# Patient Record
Sex: MALE | Race: BLACK OR AFRICAN AMERICAN | NOT HISPANIC OR LATINO | Employment: UNEMPLOYED | URBAN - METROPOLITAN AREA
[De-identification: names, ages, dates, MRNs, and addresses within clinical notes are randomized per-mention and may not be internally consistent; named-entity substitution may affect disease eponyms.]

---

## 2018-06-18 ENCOUNTER — HOSPITAL ENCOUNTER (OUTPATIENT)
Facility: HOSPITAL | Age: 38
Discharge: HOME | End: 2018-06-19
Attending: EMERGENCY MEDICINE | Admitting: INTERNAL MEDICINE
Payer: MEDICAID

## 2018-06-18 DIAGNOSIS — R07.2 PRECORDIAL PAIN: ICD-10-CM

## 2018-06-18 DIAGNOSIS — R79.89 ELEVATED TROPONIN: ICD-10-CM

## 2018-06-18 DIAGNOSIS — B18.2 CHRONIC HEPATITIS C WITHOUT HEPATIC COMA: ICD-10-CM

## 2018-06-18 DIAGNOSIS — J98.4 PNEUMONITIS: ICD-10-CM

## 2018-06-18 DIAGNOSIS — T40.1X1A ACCIDENTAL OVERDOSE OF HEROIN, INITIAL ENCOUNTER: Primary | ICD-10-CM

## 2018-06-18 DIAGNOSIS — R07.9 CHEST PAIN: ICD-10-CM

## 2018-06-18 LAB
ALBUMIN SERPL BCP-MCNC: 3.7 G/DL
ALP SERPL-CCNC: 73 U/L
ALT SERPL W/O P-5'-P-CCNC: 64 U/L
AMPHET+METHAMPHET UR QL: NEGATIVE
ANION GAP SERPL CALC-SCNC: 11 MMOL/L
APAP SERPL-MCNC: <3 UG/ML
AST SERPL-CCNC: 59 U/L
BARBITURATES UR QL SCN>200 NG/ML: NEGATIVE
BASOPHILS # BLD AUTO: 0.03 K/UL
BASOPHILS NFR BLD: 0.4 %
BENZODIAZ UR QL SCN>200 NG/ML: NEGATIVE
BILIRUB SERPL-MCNC: 1.4 MG/DL
BUN SERPL-MCNC: 11 MG/DL
BZE UR QL SCN: NEGATIVE
CALCIUM SERPL-MCNC: 9.1 MG/DL
CANNABINOIDS UR QL SCN: NEGATIVE
CHLORIDE SERPL-SCNC: 100 MMOL/L
CO2 SERPL-SCNC: 24 MMOL/L
CREAT SERPL-MCNC: 1 MG/DL
CREAT UR-MCNC: 221.4 MG/DL
DIFFERENTIAL METHOD: ABNORMAL
EOSINOPHIL # BLD AUTO: 0.1 K/UL
EOSINOPHIL NFR BLD: 1.3 %
ERYTHROCYTE [DISTWIDTH] IN BLOOD BY AUTOMATED COUNT: 12.4 %
EST. GFR  (AFRICAN AMERICAN): >60 ML/MIN/1.73 M^2
EST. GFR  (NON AFRICAN AMERICAN): >60 ML/MIN/1.73 M^2
ETHANOL SERPL-MCNC: <10 MG/DL
GLUCOSE SERPL-MCNC: 119 MG/DL
HCT VFR BLD AUTO: 46.5 %
HGB BLD-MCNC: 15.1 G/DL
LYMPHOCYTES # BLD AUTO: 1.3 K/UL
LYMPHOCYTES NFR BLD: 15.2 %
MCH RBC QN AUTO: 29.2 PG
MCHC RBC AUTO-ENTMCNC: 32.5 G/DL
MCV RBC AUTO: 90 FL
METHADONE UR QL SCN>300 NG/ML: NEGATIVE
MONOCYTES # BLD AUTO: 0.5 K/UL
MONOCYTES NFR BLD: 6.3 %
NEUTROPHILS # BLD AUTO: 6.5 K/UL
NEUTROPHILS NFR BLD: 76.7 %
OPIATES UR QL SCN: NORMAL
PCP UR QL SCN>25 NG/ML: NEGATIVE
PLATELET # BLD AUTO: 178 K/UL
PMV BLD AUTO: 11.3 FL
POTASSIUM SERPL-SCNC: 4.1 MMOL/L
PROT SERPL-MCNC: 6.9 G/DL
RBC # BLD AUTO: 5.17 M/UL
SALICYLATES SERPL-MCNC: <5 MG/DL
SODIUM SERPL-SCNC: 135 MMOL/L
TOXICOLOGY INFORMATION: NORMAL
WBC # BLD AUTO: 8.53 K/UL

## 2018-06-18 PROCEDURE — 80307 DRUG TEST PRSMV CHEM ANLYZR: CPT

## 2018-06-18 PROCEDURE — 99285 EMERGENCY DEPT VISIT HI MDM: CPT | Mod: 25

## 2018-06-18 PROCEDURE — 84484 ASSAY OF TROPONIN QUANT: CPT

## 2018-06-18 PROCEDURE — 93005 ELECTROCARDIOGRAM TRACING: CPT

## 2018-06-18 PROCEDURE — 80053 COMPREHEN METABOLIC PANEL: CPT

## 2018-06-18 PROCEDURE — 80329 ANALGESICS NON-OPIOID 1 OR 2: CPT

## 2018-06-18 PROCEDURE — 93010 ELECTROCARDIOGRAM REPORT: CPT | Mod: ,,, | Performed by: INTERNAL MEDICINE

## 2018-06-18 PROCEDURE — 80320 DRUG SCREEN QUANTALCOHOLS: CPT

## 2018-06-18 PROCEDURE — 85025 COMPLETE CBC W/AUTO DIFF WBC: CPT

## 2018-06-19 VITALS
HEIGHT: 66 IN | OXYGEN SATURATION: 97 % | SYSTOLIC BLOOD PRESSURE: 117 MMHG | TEMPERATURE: 99 F | RESPIRATION RATE: 18 BRPM | WEIGHT: 170.5 LBS | DIASTOLIC BLOOD PRESSURE: 73 MMHG | BODY MASS INDEX: 27.4 KG/M2 | HEART RATE: 61 BPM

## 2018-06-19 PROBLEM — R07.9 CHEST PAIN: Status: ACTIVE | Noted: 2018-06-19

## 2018-06-19 PROBLEM — B19.20 HEPATITIS C: Status: ACTIVE | Noted: 2018-06-19

## 2018-06-19 PROBLEM — J98.4 PNEUMONITIS: Status: ACTIVE | Noted: 2018-06-19

## 2018-06-19 PROBLEM — R79.89 ELEVATED TROPONIN: Status: ACTIVE | Noted: 2018-06-19

## 2018-06-19 PROBLEM — T40.1X1A ACCIDENTAL OVERDOSE OF HEROIN: Status: ACTIVE | Noted: 2018-06-19

## 2018-06-19 LAB
ANION GAP SERPL CALC-SCNC: 8 MMOL/L
BASOPHILS # BLD AUTO: 0.02 K/UL
BASOPHILS NFR BLD: 0.2 %
BUN SERPL-MCNC: 11 MG/DL
CALCIUM SERPL-MCNC: 9.1 MG/DL
CHLORIDE SERPL-SCNC: 102 MMOL/L
CK SERPL-CCNC: 554 U/L
CO2 SERPL-SCNC: 26 MMOL/L
CREAT SERPL-MCNC: 0.9 MG/DL
DIASTOLIC DYSFUNCTION: NO
DIFFERENTIAL METHOD: ABNORMAL
EOSINOPHIL # BLD AUTO: 0.1 K/UL
EOSINOPHIL NFR BLD: 1.6 %
ERYTHROCYTE [DISTWIDTH] IN BLOOD BY AUTOMATED COUNT: 12.5 %
EST. GFR  (AFRICAN AMERICAN): >60 ML/MIN/1.73 M^2
EST. GFR  (NON AFRICAN AMERICAN): >60 ML/MIN/1.73 M^2
ESTIMATED PA SYSTOLIC PRESSURE: 21.15
GLUCOSE SERPL-MCNC: 105 MG/DL
HCT VFR BLD AUTO: 42.7 %
HGB BLD-MCNC: 13.9 G/DL
LYMPHOCYTES # BLD AUTO: 2.6 K/UL
LYMPHOCYTES NFR BLD: 29.2 %
MCH RBC QN AUTO: 29 PG
MCHC RBC AUTO-ENTMCNC: 32.6 G/DL
MCV RBC AUTO: 89 FL
MONOCYTES # BLD AUTO: 0.7 K/UL
MONOCYTES NFR BLD: 7.7 %
NEUTROPHILS # BLD AUTO: 5.4 K/UL
NEUTROPHILS NFR BLD: 61.2 %
PLATELET # BLD AUTO: 177 K/UL
PMV BLD AUTO: 11.9 FL
POTASSIUM SERPL-SCNC: 4.2 MMOL/L
RBC # BLD AUTO: 4.8 M/UL
RETIRED EF AND QEF - SEE NOTES: 65 (ref 55–65)
SODIUM SERPL-SCNC: 136 MMOL/L
TROPONIN I SERPL DL<=0.01 NG/ML-MCNC: 0.03 NG/ML
TROPONIN I SERPL DL<=0.01 NG/ML-MCNC: 0.04 NG/ML
TROPONIN I SERPL DL<=0.01 NG/ML-MCNC: 0.07 NG/ML
TROPONIN I SERPL DL<=0.01 NG/ML-MCNC: 0.1 NG/ML
WBC # BLD AUTO: 8.81 K/UL

## 2018-06-19 PROCEDURE — 80048 BASIC METABOLIC PNL TOTAL CA: CPT

## 2018-06-19 PROCEDURE — 84484 ASSAY OF TROPONIN QUANT: CPT | Mod: 91

## 2018-06-19 PROCEDURE — 36415 COLL VENOUS BLD VENIPUNCTURE: CPT

## 2018-06-19 PROCEDURE — 82550 ASSAY OF CK (CPK): CPT

## 2018-06-19 PROCEDURE — 93306 TTE W/DOPPLER COMPLETE: CPT

## 2018-06-19 PROCEDURE — G0378 HOSPITAL OBSERVATION PER HR: HCPCS

## 2018-06-19 PROCEDURE — 93010 ELECTROCARDIOGRAM REPORT: CPT | Mod: 76,,, | Performed by: INTERNAL MEDICINE

## 2018-06-19 PROCEDURE — 25500020 PHARM REV CODE 255: Performed by: EMERGENCY MEDICINE

## 2018-06-19 PROCEDURE — 84484 ASSAY OF TROPONIN QUANT: CPT

## 2018-06-19 PROCEDURE — 80074 ACUTE HEPATITIS PANEL: CPT

## 2018-06-19 PROCEDURE — 93010 ELECTROCARDIOGRAM REPORT: CPT | Mod: ,,, | Performed by: INTERNAL MEDICINE

## 2018-06-19 PROCEDURE — 93005 ELECTROCARDIOGRAM TRACING: CPT

## 2018-06-19 PROCEDURE — 86703 HIV-1/HIV-2 1 RESULT ANTBDY: CPT

## 2018-06-19 PROCEDURE — 63600175 PHARM REV CODE 636 W HCPCS: Performed by: STUDENT IN AN ORGANIZED HEALTH CARE EDUCATION/TRAINING PROGRAM

## 2018-06-19 PROCEDURE — 85025 COMPLETE CBC W/AUTO DIFF WBC: CPT

## 2018-06-19 PROCEDURE — 25000003 PHARM REV CODE 250: Performed by: EMERGENCY MEDICINE

## 2018-06-19 RX ORDER — HEPARIN SODIUM 5000 [USP'U]/ML
5000 INJECTION, SOLUTION INTRAVENOUS; SUBCUTANEOUS EVERY 8 HOURS
Status: DISCONTINUED | OUTPATIENT
Start: 2018-06-19 | End: 2018-06-19 | Stop reason: HOSPADM

## 2018-06-19 RX ORDER — ACETAMINOPHEN 325 MG/1
650 TABLET ORAL EVERY 6 HOURS PRN
Status: DISCONTINUED | OUTPATIENT
Start: 2018-06-19 | End: 2018-06-19 | Stop reason: HOSPADM

## 2018-06-19 RX ORDER — ASPIRIN 325 MG
325 TABLET ORAL
Status: COMPLETED | OUTPATIENT
Start: 2018-06-19 | End: 2018-06-19

## 2018-06-19 RX ADMIN — ASPIRIN 325 MG ORAL TABLET 325 MG: 325 PILL ORAL at 12:06

## 2018-06-19 RX ADMIN — IOHEXOL 100 ML: 350 INJECTION, SOLUTION INTRAVENOUS at 02:06

## 2018-06-19 RX ADMIN — HEPARIN SODIUM 5000 UNITS: 5000 INJECTION, SOLUTION INTRAVENOUS; SUBCUTANEOUS at 06:06

## 2018-06-19 NOTE — ED NOTES
"Pt reports had hiccups for over 24 hours and chest pain yesterday. Left sided and had a "dry Cracking" pain. Denies pain at this time. Will continue to monitor closely.   "

## 2018-06-19 NOTE — ED NOTES
"Pt presents to ED secondary to suspected drug overdose. EMS reports significant other found patient on bathroom floor with agonal respirations. Reported to have history of heroin addiction and previous rehab completion. EMS reports given 1mg IV and pt became responsive. Upon arrival, pt appears to be angry. States "dont talk about me like you know me." Denies drug use. Alert and oriented. Will continue to monitor closely.     APPEARANCE: Alert, oriented and in no acute distress.  CARDIAC: Normal rate   PERIPHERAL VASCULAR: peripheral pulses present. Normal cap refill. No edema. Warm to touch.    RESPIRATORY:Normal rate and effort, breath sounds clear bilaterally throughout chest. Respirations are equal and unlabored no obvious signs of distress.  GASTRO: soft, bowel sounds normal, no tenderness, no abdominal distention.  MUSC: Full ROM. No bony tenderness or soft tissue tenderness. No obvious deformity.  SKIN: Skin is warm and dry, normal skin turgor, mucous membranes moist.  NEURO: 5/5 strength major flexors/extensors bilaterally. Sensory intact to light touch bilaterally. Supa coma scale: eyes open spontaneously-4, oriented & converses-5, obeys commands-6. No neurological abnormalities.     "

## 2018-06-19 NOTE — PLAN OF CARE
Gave patient drug rehab written resources.  Patient will [facility]up [facility]    No dme or home health needs    Discharge rounds on patient. Discussed followup appointments, blue discharge folder, discharge nurse will go over home medications and reasons for medications and final discharge instructions. All patient/caregiver questions answered. Patient verbalized understanding.         06/19/18 1540   Final Note   Assessment Type Final Discharge Note   Discharge Disposition Home   Hospital Follow Up  Appt(s) scheduled? No   Discharge plans and expectations educations in teach back method with documentation complete? Yes   Right Care Referral Info   Post Acute Recommendation No Care     Sarah Ashraf, RN, CCM, CMSRN  RN Transition Navigator  [phone]

## 2018-06-19 NOTE — ED PROVIDER NOTES
Encounter Date: 6/18/2018    SCRIBE #1 NOTE: I, Nicho Mattson, am scribing for, and in the presence of,  Sosa Long MD. I have scribed the entire note.        History     Chief Complaint   Patient presents with    Drug Overdose     Pt to ED via EMS with reports of being found by significant other on bathroom floor with agonal respirations. States SO had to break the door down to get to patient. Paramedic reports giving narcan 1mg IV and patient became responsive. Awake, alert and oriented on arrival. Denies drug use. Flat affect noted.      Time patient was seen by the provider: 10:21 PM      The patient is a 37 y.o. male who presents to the ED with a complaint of possible drug overdose. The patient states he has a HX of Heroin abuse but has been clean for 4 months. The patient's girlfriend noticed the patient's absence at home tonight. She had to break down the bathroom door where she found him down on the ground unresponsive. EMS arrived and he had sonorous respirations.  They gave 1mg of Narcan and report he woke up almost immediately. The patient denies taking drugs but states that he very likely did because narcan made him wake up. At this time he reports headache and states he had hiccups all day yesterday and episodes of chest pain in the last day or two. Otherwise he has no complaints. He denies SI/HI.     He reports HX of alcohol use, denies cigarettes.            Review of patient's allergies indicates:  No Known Allergies  History reviewed. No pertinent past medical history.  Past Surgical History:   Procedure Laterality Date    NOSE SURGERY       History reviewed. No pertinent family history.  Social History   Substance Use Topics    Smoking status: Current Every Day Smoker     Packs/day: 0.50     Types: Cigarettes    Smokeless tobacco: Not on file    Alcohol use No     Review of Systems   Constitutional: Negative for fever.   HENT: Negative for sore throat.         Hiccups   Respiratory: Negative for  shortness of breath.    Cardiovascular: Positive for chest pain.   Gastrointestinal: Negative for nausea.   Genitourinary: Negative for dysuria.   Musculoskeletal: Negative for back pain.   Skin: Negative for rash.   Neurological: Positive for headaches. Negative for weakness.        LOC   Hematological: Does not bruise/bleed easily.   Psychiatric/Behavioral: Negative for suicidal ideas.   All other systems reviewed and are negative.      Physical Exam     Initial Vitals [06/18/18 2206]   BP Pulse Resp Temp SpO2   117/76 92 16 98.6 °F (37 °C) (!) 92 %      MAP       --         Physical Exam    Nursing note and vitals reviewed.  Constitutional: He appears well-developed and well-nourished. No distress.   HENT:   Head: Normocephalic and atraumatic.   Eyes: EOM are normal. Pupils are equal, round, and reactive to light.   Neck: Normal range of motion. Neck supple.   Cardiovascular: Normal rate, regular rhythm and normal heart sounds.   Pulmonary/Chest: Breath sounds normal. No respiratory distress.   Abdominal: Bowel sounds are normal.   Musculoskeletal: He exhibits no edema.   Neurological: He is alert and oriented to person, place, and time.   Skin: Skin is warm and dry.   Psychiatric: He has a normal mood and affect. His behavior is normal.         ED Course   Procedures  Labs Reviewed   CBC W/ AUTO DIFFERENTIAL - Abnormal; Notable for the following:        Result Value    Gran% 76.7 (*)     Lymph% 15.2 (*)     All other components within normal limits   COMPREHENSIVE METABOLIC PANEL - Abnormal; Notable for the following:     Sodium 135 (*)     Glucose 119 (*)     Total Bilirubin 1.4 (*)     AST 59 (*)     ALT 64 (*)     All other components within normal limits   SALICYLATE LEVEL - Abnormal; Notable for the following:     Salicylate Lvl <5.0 (*)     All other components within normal limits   ACETAMINOPHEN LEVEL - Abnormal; Notable for the following:     Acetaminophen (Tylenol), Serum <3.0 (*)     All other  components within normal limits   TROPONIN I - Abnormal; Notable for the following:     Troponin I 0.039 (*)     All other components within normal limits   TROPONIN I - Abnormal; Notable for the following:     Troponin I 0.096 (*)     All other components within normal limits   DRUG SCREEN PANEL, URINE EMERGENCY   ALCOHOL,MEDICAL (ETHANOL)     EKG Readings: (Independently Interpreted)   Rhythm: Normal Sinus Rhythm. Heart Rate: 82. ST Segments: Normal ST Segments. T Waves Flipped: III and AVF. Axis: Normal. Q Waves: III.   Other EKG Interpretations: EKG #2  NSR, HR 64  TWIs III, aVF  Q wave III       Imaging Results          X-Ray Chest PA And Lateral (Final result)  Result time 06/18/18 22:53:14    Final result by Sebastian Colon MD (06/18/18 22:53:14)                 Impression:      No acute cardiopulmonary process.      Electronically signed by: Sebastian Colon MD  Date:    06/18/2018  Time:    22:53             Narrative:    EXAMINATION:  XR CHEST PA AND LATERAL    CLINICAL HISTORY:  CP;    TECHNIQUE:  PA and lateral views of the chest were performed.    COMPARISON:  None.    FINDINGS:  There is no consolidation, effusion, or pneumothorax.    Cardiomediastinal silhouette is unremarkable.    Regional osseous structures are unremarkable.                                 Medical Decision Making:   History:   I obtained history from: EMS provider.  Clinical Tests:   Lab Tests: Ordered and Reviewed  Radiological Study: Ordered and Reviewed  Medical Tests: Reviewed and Ordered  ED Management:  37M with presumed drug overdose - found unresponsive in bathroom at home; hx of heroin use.  +response to narcan.  Reported recent CP in ER.  Troponin is elevated. +opiates on drug screen.  No cocaine.  Will repeat 3 hour troponin.  Also with sats consistently 94%, will check CTA.    Repeat troponin is more elevated.  Awaiting CTA.  Will plan to admit.      Case discussed with Dr. Calhoun at end of my shift.  Pt awaiting CTA  results and admission.  Other:   I have discussed this case with another health care provider.                      Clinical Impression:   The primary encounter diagnosis was Accidental overdose of heroin, initial encounter. A diagnosis of Elevated troponin was also pertinent to this visit.         I, Dr. Sosa Long, personally performed the services described in this documentation.   All medical record entries made by the scribe were at my direction and in my presence.   I have reviewed the chart and agree that the record is accurate and complete.   Sosa Long MD.  1:26 AM 06/19/2018                    Sosa Long MD  06/19/18 0227

## 2018-06-19 NOTE — PLAN OF CARE
"Patient AAOx3  Lives at home with mother  Was in [place] at rehab ([facility] - inpatient drug rehab)- for 30 days then lived in a sober living facility- he said he had to move back home to [place] due to being on parole until October 2018. Patient states he "slipped up" but wants help and doesn't want to do drugs.   Lives at home with mother.  has 2 children.    Interested in drug rehab resources.        06/19/18 1053   Discharge Assessment   Assessment Type Discharge Planning Assessment   Confirmed/corrected address and phone number on facesheet? Yes   Assessment information obtained from? Patient   Communicated expected length of stay with patient/caregiver yes   Prior to hospitilization cognitive status: Alert/Oriented   Prior to hospitalization functional status: Independent   Current cognitive status: Alert/Oriented   Current Functional Status: Independent   Lives With parent(s)   Able to Return to Prior Arrangements yes   Patient's perception of discharge disposition home or selfcare   Readmission Within The Last 30 Days no previous admission in last 30 days   Patient currently being followed by outpatient case management? No   Patient currently receives any other outside agency services? Yes   Is it the patient/care giver preference to resume care with the current outside agency? No   Equipment Currently Used at Home none   Do you have any problems affording any of your prescribed medications? No   Is the patient taking medications as prescribed? yes   Does the patient have transportation home? Yes   Transportation Available family or friend will provide   Discharge Plan A Home   Discharge Plan B Home   Patient/Family In Agreement With Plan yes     Sarah Ashraf RN, CCM, CMSRN  RN Transition Navigator  [phone]      "

## 2018-06-19 NOTE — H&P
"[facility] Medicine   History and Physical  Intern Note    Admitting Team: [facility] internal medicine Team A  Attending Physician: Chris  Resident: Yamileth  Interns: Dena Medina Float: Lorenzo    Date of Admit: 6/18/2018    Chief Complaint     Heroin Overdose for 1 day    Subjective:      History of Present Illness:  José Luis Rolon is a 37 y.o.  male with a PMHx of Heroin abuse and hepatitis C infection. The patient presented to [facility] on 6/18/2018 with a primary complaint of LOC.    The patient was in their usual state of health until The evening of presentation. The patient was reportedly found in the bathroom by his significant other unconscious and with agonal breathing. EMS was called and the patient was given narcan 1 mg IV x 1 in the field with improvement in his Mental status. The patient became more responsive after Narcan and he was brought to the emergency room. Patient reports that he dose not remember much of the event but believes he was only on the floor for approximately 20 minutes. He denies using heroin today but again reports that he does not remember anything over the past couple of days. In addition, the patient reports that he has been having substernal chest pain for approximately 1 day. He has difficulty describing the pain but reports that it feels like something is "Crackling in his chest" and that he feels like he may need to throw up. The pain is worse when he bends over. He also notices it more while walking. He otherwise denies having any complaints. Denies fever, chills, nausea, vomiting, abdominal pain, HA, vision changes, heart palpitations, or any other aches or pain.     Past Medical History:  History reviewed. No pertinent past medical history.    Past Surgical History:  Past Surgical History:   Procedure Laterality Date    NOSE SURGERY          Allergies:  Review of patient's allergies indicates:  No Known Allergies    Home " "Medications:  Prior to Admission medications    Medication Sig Start Date End Date Taking? Authorizing Provider   naproxen sodium (ANAPROX) 550 MG tablet Take 1 tablet (550 mg total) by mouth 2 (two) times daily with meals. 16  Yes SHAILESH Vela       Family History:  History reviewed. No pertinent family history.    Social History:  Social History   Substance Use Topics    Smoking status: Current Every Day Smoker     Packs/day: 0.50     Types: Cigarettes    Smokeless tobacco: Not on file    Alcohol use No       Review of Systems:  Pertinent items are noted in HPI. All other systems are reviewed and are negative.    Health Maintaince :   Primary Care Physician: Sadaf Stratton)  Immunizations:   TDap is up to date.  Influenza is up to date.  Pneumovax is not up to date.        Objective:   Last 24 Hour Vital Signs:  BP  Min: 100/52  Max: 121/69  Temp  Av.5 °F (36.9 °C)  Min: 98.4 °F (36.9 °C)  Max: 98.6 °F (37 °C)  Pulse  Av.6  Min: 63  Max: 92  Resp  Av.9  Min: 10  Max: 18  SpO2  Av %  Min: 91 %  Max: 98 %  Height  Av' 6" (167.6 cm)  Min: 5' 6" (167.6 cm)  Max: 5' 6" (167.6 cm)  Weight  Av.2 kg (170 lb 4 oz)  Min: 77.1 kg (170 lb)  Max: 77.3 kg (170 lb 8 oz)  Body mass index is 27.52 kg/m².  No intake/output data recorded.    Physical Examination:  General: Sitting up, HOB at 45 degrees, alert, NAD  HEAD: Normocephalic, Atraumatic    Eyes: EOMI, Pupil equal, round, and reactive to light   Oropharynx: Clear, MMM, Normal dentition   Neck: Supple, Trachea midline   Chest: Non-tender to palpation  Heart: RRR, No murmurs, gallops, or rubs   Abdomen: Soft, non-tender, non-distended   Extremities: WWP, no edema   Pulses 2+  Skin: no rashes or skin changes appreciated   Neuro: Alert and oriented to person and place, confused to time/ day of the week.       Laboratory:  Most Recent Data:  CBC: Lab Results   Component Value Date    WBC 8.53 2018    HGB 15.1 " 06/18/2018    HCT 46.5 06/18/2018     06/18/2018    MCV 90 06/18/2018    RDW 12.4 06/18/2018       BMP: Lab Results   Component Value Date     (L) 06/18/2018    K 4.1 06/18/2018     06/18/2018    CO2 24 06/18/2018    BUN 11 06/18/2018    CREATININE 1.0 06/18/2018     (H) 06/18/2018    CALCIUM 9.1 06/18/2018    MG 1.8 09/06/2010    PHOS 3.0 09/06/2010     LFTs: Lab Results   Component Value Date    PROT 6.9 06/18/2018    ALBUMIN 3.7 06/18/2018    BILITOT 1.4 (H) 06/18/2018    AST 59 (H) 06/18/2018    ALKPHOS 73 06/18/2018    ALT 64 (H) 06/18/2018     Coags: No results found for: INR, PROTIME, PTT  FLP: Lab Results   Component Value Date    CHOL 88 (L) 09/05/2010    HDL 36 (L) 09/05/2010    LDLCALC 37.0 (L) 09/05/2010    TRIG 75 09/05/2010    CHOLHDL 40.9 09/05/2010     DM: Lab Results   Component Value Date    LDLCALC 37.0 (L) 09/05/2010    CREATININE 1.0 06/18/2018     Thyroid: No results found for: TSH, FREET4, S6DOZUA, E2LLWLC, THYROIDAB  Anemia: No results found for: IRON, TIBC, FERRITIN, DBBAPJXK10, FOLATE  Cardiac: Lab Results   Component Value Date    TROPONINI 0.096 (H) 06/19/2018    BNP 58 09/05/2010     Urinalysis: Lab Results   Component Value Date    COLORU ÁLVARO 09/04/2010    SPECGRAV 1.025 09/04/2010    NITRITE NEG 09/04/2010    KETONESU NEG 09/04/2010    UROBILINOGEN 8 (A) 09/04/2010    WBCUA 1-2 09/04/2010       Trended Lab Data:    Recent Labs  Lab 06/18/18  2313   WBC 8.53   HGB 15.1   HCT 46.5      MCV 90   RDW 12.4   *   K 4.1      CO2 24   BUN 11   CREATININE 1.0   *   PROT 6.9   ALBUMIN 3.7   BILITOT 1.4*   AST 59*   ALKPHOS 73   ALT 64*       Trended Cardiac Data:    Recent Labs  Lab 06/18/18  2313 06/19/18  0131   TROPONINI 0.039* 0.096*       Microbiology Data:      Other Results:  EKG (my interpretation):         Radiology:  Imaging Results          CTA Chest Non-Coronary (PE Study) (Final result)  Result time 06/19/18 02:57:14    Final  result by Hiram Borden MD (06/19/18 02:57:14)                 Impression:      Extensive motion degraded examination.  No filling defects within the central pulmonary tree.  Suggest correlation with venous studies and additional clinical parameters.    Diffuse ground-glass airspace opacities in the right upper lobe and bilateral lower lobes.  The findings represent nonspecific pneumonitis.    Additional findings as above.      Electronically signed by: Hiram Borden MD  Date:    06/19/2018  Time:    02:57             Narrative:    EXAMINATION:  CTA CHEST NON CORONARY    CLINICAL HISTORY:  Chest pain, acute, PE suspected, low pretest prob;    TECHNIQUE:  Low dose axial images, sagittal and coronal reformations were obtained from the thoracic inlet to the lung bases following the IV administration of 100 mL of Omnipaque 350.  Contrast timing was optimized to evaluate the pulmonary arteries.  MIP images were performed.    COMPARISON:  Chest x-ray dated 06/18/2018    FINDINGS:  CT pulmonary embolism examination:    The examination is somewhat degraded secondary to motion.  No filling defects are identified within the central pulmonary tree.  The distal and segmental pulmonary tree are poorly visualized.    CT chest examination:    The thyroid gland is within normal limits.  The base of the neck is unremarkable.  The supraclavicular regions are unremarkable.    The trachea and central airways are within normal limits.  No endobronchial lesion is identified.  There is no evidence of bronchiectasis.    The heart is unremarkable.  There are no pericardial effusions.  There is no evidence of intracardiac thrombus.  No coronary artery calcifications are identified.    The thoracic aorta is normal in caliber.  There is no intimal flap to suggest dissection.  The great vessels arising from the aortic arch are within normal limits.  There is an azygos fissure.    There are subcentimeter subaortic and left paratracheal lymph  nodes.  There is a 1.2 x 1.3 cm right hilar lymph node.  The axillary regions are within normal limits.    There are no pleural effusions.  There is no evidence of a pneumothorax.  There is no evidence of pneumomediastinum.  There are ground-glass airspace opacities in the right upper lobe and bilateral lower lobes.  No discrete pulmonary nodule is present.    The esophagus is within normal limits.  The visualized upper abdominal structures are unremarkable.  There is no evidence of free air in the upper abdomen.    The chest wall is unremarkable.  The osseous structures are unremarkable.                               X-Ray Chest PA And Lateral (Final result)  Result time 06/18/18 22:53:14    Final result by Sebastian Colon MD (06/18/18 22:53:14)                 Impression:      No acute cardiopulmonary process.      Electronically signed by: Sebastian Colon MD  Date:    06/18/2018  Time:    22:53             Narrative:    EXAMINATION:  XR CHEST PA AND LATERAL    CLINICAL HISTORY:  CP;    TECHNIQUE:  PA and lateral views of the chest were performed.    COMPARISON:  None.    FINDINGS:  There is no consolidation, effusion, or pneumothorax.    Cardiomediastinal silhouette is unremarkable.    Regional osseous structures are unremarkable.                                   Assessment:     José Luis Rolon is a 37 y.o. male with a PMHx of  presenting at [facility] with Opiate Overdose       Plan:     Chest pain:   -Atypical chest pain x 1 day   -Troponin 0.039 --> 0.096. Trending   -EKG with T-wave inversions in inferior leads - trending   -Echo pending     Opiate Abuse  -Patient reports that last use was over 1 week ago   -Does not remember using yesterday   -Responsive to Narcan   -  on Cessation   -Tx withdrawal symptoms PRN     Hepatitis C   -Patient reports known infection   AST 59, ALT 64   -Hep panel pending, HIV pending     Pneumonitis  -Noted on chest CTA  -Likely 2/2 Heroin Abuse   -No respiratory  distress   -Follow up with PCP    DVT ppx: Heparin     Dispo: admit to observation. Pending chest pain work up.       Code Status:     FULL code    [unfilled]  [facility] Internal medicine team A     [facility] Medicine Hospitalist Pager numbers:   [facility] Hospitalist Medicine Team A (Marybel/Chris): [phone]  [facility] Hospitalist Medicine Team B (Barbi/Ash):  [phone]

## 2018-06-19 NOTE — ED NOTES
"Pt states pain to middle of chest rated at a 3 out of 10. He states that when trying to drink water, he feels like "the water get stuck" in the middle of his chest, and pain goes to a 7 out of 10. Pt swallowed the water and medicine without deficits, but did cause this exacerbation of pain.  "

## 2018-06-20 LAB
HAV IGM SERPL QL IA: NEGATIVE
HBV CORE IGM SERPL QL IA: NEGATIVE
HBV SURFACE AG SERPL QL IA: NEGATIVE
HCV AB SERPL QL IA: POSITIVE
HIV 1+2 AB+HIV1 P24 AG SERPL QL IA: NEGATIVE
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
## 2018-10-03 ENCOUNTER — HOSPITAL ENCOUNTER (EMERGENCY)
Facility: HOSPITAL | Age: 38
Discharge: HOME | End: 2018-10-03
Attending: EMERGENCY MEDICINE
Payer: MEDICAID

## 2018-10-03 VITALS
HEIGHT: 66 IN | SYSTOLIC BLOOD PRESSURE: 120 MMHG | HEART RATE: 72 BPM | TEMPERATURE: 99 F | BODY MASS INDEX: 27.32 KG/M2 | WEIGHT: 170 LBS | DIASTOLIC BLOOD PRESSURE: 76 MMHG | OXYGEN SATURATION: 100 % | RESPIRATION RATE: 18 BRPM
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
## 2018-10-03 DIAGNOSIS — M25.522 ELBOW PAIN, LEFT: ICD-10-CM

## 2018-10-03 DIAGNOSIS — S50.12XA: Primary | ICD-10-CM

## 2018-10-03 PROCEDURE — 99284 EMERGENCY DEPT VISIT MOD MDM: CPT | Mod: 25

## 2018-10-03 PROCEDURE — 96372 THER/PROPH/DIAG INJ SC/IM: CPT

## 2018-10-03 PROCEDURE — 63600175 PHARM REV CODE 636 W HCPCS: Performed by: EMERGENCY MEDICINE

## 2018-10-03 RX ORDER — KETOROLAC TROMETHAMINE 30 MG/ML
30 INJECTION, SOLUTION INTRAMUSCULAR; INTRAVENOUS
Status: COMPLETED | OUTPATIENT
Start: 2018-10-03 | End: 2018-10-03

## 2018-10-03 RX ORDER — IBUPROFEN 800 MG/1
800 TABLET ORAL EVERY 6 HOURS PRN
Qty: 20 TABLET | Refills: 0 | Status: SHIPPED | OUTPATIENT
Start: 2018-10-03 | End: 2018-10-06

## 2018-10-03 RX ADMIN — KETOROLAC TROMETHAMINE 30 MG: 30 INJECTION, SOLUTION INTRAMUSCULAR at 08:10
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
## 2019-04-21 ENCOUNTER — HOSPITAL ENCOUNTER (EMERGENCY)
Facility: HOSPITAL | Age: 39
Discharge: HOME | End: 2019-04-21
Attending: EMERGENCY MEDICINE
Payer: MEDICAID

## 2019-04-21 VITALS
RESPIRATION RATE: 18 BRPM | HEIGHT: 66 IN | SYSTOLIC BLOOD PRESSURE: 122 MMHG | HEART RATE: 75 BPM | BODY MASS INDEX: 27.32 KG/M2 | WEIGHT: 170 LBS | OXYGEN SATURATION: 98 % | TEMPERATURE: 98 F | DIASTOLIC BLOOD PRESSURE: 84 MMHG

## 2019-04-21 DIAGNOSIS — S00.03XA CONTUSION OF SCALP, INITIAL ENCOUNTER: Primary | ICD-10-CM

## 2019-04-21 DIAGNOSIS — S06.0X0A CONCUSSION WITHOUT LOSS OF CONSCIOUSNESS, INITIAL ENCOUNTER: ICD-10-CM

## 2019-04-21 PROCEDURE — 99284 EMERGENCY DEPT VISIT MOD MDM: CPT | Mod: 25

## 2019-04-21 PROCEDURE — 63600175 PHARM REV CODE 636 W HCPCS: Performed by: EMERGENCY MEDICINE

## 2019-04-21 PROCEDURE — 96372 THER/PROPH/DIAG INJ SC/IM: CPT

## 2019-04-21 RX ORDER — KETOROLAC TROMETHAMINE 30 MG/ML
15 INJECTION, SOLUTION INTRAMUSCULAR; INTRAVENOUS
Status: COMPLETED | OUTPATIENT
Start: 2019-04-21 | End: 2019-04-21

## 2019-04-21 RX ORDER — IBUPROFEN 800 MG/1
800 TABLET ORAL 3 TIMES DAILY PRN
Qty: 20 TABLET | Refills: 0 | Status: SHIPPED | OUTPATIENT
Start: 2019-04-21 | End: 2024-02-02 | Stop reason: CLARIF

## 2019-04-21 RX ADMIN — KETOROLAC TROMETHAMINE 15 MG: 30 INJECTION, SOLUTION INTRAMUSCULAR at 05:04

## 2019-04-21 NOTE — ED PROVIDER NOTES
Encounter Date: 4/21/2019    SCRIBE #1 NOTE: I, Maria T Cantrell, am scribing for, and in the presence of,  Dr. Cali. I have scribed the entire note.       History     Chief Complaint   Patient presents with    Fall     fell last night and hit head.  Patient states he had LOC for a short paeriod of time.  Complaints of headache today.     Time seen by provider: 5:29 PM    This is a 38 y.o. male who presents with complaint of head injury s/p fall. He reports onset of incident was last night. The patient was sitting down when he fell forward. He suspects he was tired as he just got off work at 3 AM prior to falling. The patient reports passing out for a short period of time. He had to sit on the ground for a time due to feeling numbness in his legs. After which the patient went to bed. He woke this morning with a headache. The patient has associated scrap to his forehead. He denies any changes in vision, dizziness, light headedness, nausea, vomiting, pain in the extremities, neck pain or back pain. The patient denies any drug use or alcohol use prior to passing out. However, he admits to heroin use with last use being this morning.       The history is provided by the patient.     Review of patient's allergies indicates:  No Known Allergies  History reviewed. No pertinent past medical history.  History reviewed. No pertinent surgical history.  No family history on file.  Social History     Tobacco Use    Smoking status: Current Every Day Smoker     Packs/day: 0.50   Substance Use Topics    Alcohol use: Not on file    Drug use: Not on file     Review of Systems   Constitutional: Negative for chills and fever.   HENT: Negative for congestion, ear pain, rhinorrhea and sore throat.    Respiratory: Negative for cough, shortness of breath and wheezing.    Cardiovascular: Negative for chest pain and palpitations.   Gastrointestinal: Negative for abdominal pain, diarrhea, nausea and vomiting.   Genitourinary:  Negative for dysuria and hematuria.   Musculoskeletal: Negative for back pain, myalgias and neck pain.   Skin: Positive for wound. Negative for rash.   Neurological: Positive for headaches. Negative for dizziness, weakness and light-headedness.   Psychiatric/Behavioral: Negative for confusion.       Physical Exam     Initial Vitals [04/21/19 1716]   BP Pulse Resp Temp SpO2   128/89 79 16 98.1 °F (36.7 °C) 98 %      MAP       --         Physical Exam    Nursing note and vitals reviewed.  Constitutional: He appears well-developed and well-nourished. He is diaphoretic (slightly). No distress.   Mildly somnolent   HENT:   Head: Normocephalic and atraumatic.   Mouth/Throat: Oropharynx is clear and moist.   Large hematoma over right frontal region. No malocclusion   Eyes: Conjunctivae and EOM are normal.   Neck: Normal range of motion. Neck supple.   Cardiovascular: Normal rate, regular rhythm and normal heart sounds. Exam reveals no gallop and no friction rub.    No murmur heard.  Pulmonary/Chest: Breath sounds normal. He has no wheezes. He has no rhonchi. He has no rales.   Abdominal: Soft. There is no tenderness. There is no rebound and no guarding.   Musculoskeletal: Normal range of motion. He exhibits no edema or tenderness.   No midline C/T/L spine tenderness   Lymphadenopathy:     He has no cervical adenopathy.   Neurological: He is alert and oriented to person, place, and time. He has normal strength. No cranial nerve deficit or sensory deficit. GCS score is 15. GCS eye subscore is 4. GCS verbal subscore is 5. GCS motor subscore is 6.   Skin: Skin is warm. No rash noted.         ED Course   Procedures  Labs Reviewed - No data to display       Imaging Results          CT Head Without Contrast (Final result)  Result time 04/21/19 17:54:30    Final result by Toan San MD (04/21/19 17:54:30)                 Impression:      1. No acute intracranial abnormalities.  2. Soft tissue edema/hematoma overlying the  left frontal and left parietal regions, correlation with site of trauma recommended.  3. Nasal bone injury, appears chronic although correlation is advised.      Electronically signed by: Toan San MD  Date:    04/21/2019  Time:    17:54             Narrative:    EXAMINATION:  CT HEAD WITHOUT CONTRAST    CLINICAL HISTORY:  Headache, basilar or orbital;    TECHNIQUE:  Low dose axial images were obtained through the head.  Coronal and sagittal reformations were also performed. Contrast was not administered.    COMPARISON:  None.    FINDINGS:  There is no evidence of acute major vascular territory infarct, hemorrhage, or mass.  There is no hydrocephalus.  There are no abnormal extra-axial fluid collections.  The paranasal sinuses and mastoid air cells are clear, and there is no evidence of calvarial fracture.  The visualized soft tissues are remarkable soft tissue edema/hematoma overlying the left frontal calvarium.  There is angulation of the nasal bones to the right, no overlying nasal edema, this may reflect chronic injury however correlation with any focal tenderness is recommended.  An additional focus of soft tissue induration is noted overlying the posterior left parietal region.  The bilateral globes and orbital content are grossly unremarkable..                                                      Clinical Impression:     1. Contusion of scalp, initial encounter    2. Concussion without loss of consciousness, initial encounter             Physician Attestation for Scribe:  Physician Attestation Statement for Scribe #1: I, anand cali, reviewed documentation, as scribed by leonardo ocasio in my presence, and it is both accurate and complete.                  Anand Cali MD  04/22/19 0244

## 2019-04-21 NOTE — ED NOTES
Pt here c/o headache that goes from 2/10 to 7/10 intermittently. Left forehead with hematoma-nonbleeding but abrasion present. Skin otherwise is warm dry/normal coloring for ethnicity. Pt denies alcohol use. 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 States he does use heroin regularly-but was not on it last night. State did use heroin this a.m. denies daily home meds, bleeding or clotting disorder or surgical history. Speech is slightly slurred. resp are regular and unlabored. denies any other s/s excpet for headache.    How Did Your Itching Occur?: sudden in onset (over a period of weeks to a few months) How Severe Is Your Itching?: moderate Additional History: Patient states she found nits in her hair a few days ago. She used Rid shampoo for lice and had a heat treatment. She has now been feeling crawling sensations throughout the body.

## 2019-05-04 ENCOUNTER — HOSPITAL ENCOUNTER (EMERGENCY)
Facility: HOSPITAL | Age: 39
Discharge: HOME | End: 2019-05-04
Attending: EMERGENCY MEDICINE
Payer: MEDICAID

## 2019-05-04 VITALS
HEIGHT: 66 IN | RESPIRATION RATE: 16 BRPM | WEIGHT: 165 LBS | HEART RATE: 73 BPM | BODY MASS INDEX: 26.52 KG/M2 | TEMPERATURE: 98 F | SYSTOLIC BLOOD PRESSURE: 111 MMHG | OXYGEN SATURATION: 100 % | DIASTOLIC BLOOD PRESSURE: 71 MMHG

## 2019-05-04 DIAGNOSIS — B35.3 TINEA PEDIS OF BOTH FEET: Primary | ICD-10-CM

## 2019-05-04 LAB — POCT GLUCOSE: 124 MG/DL (ref 70–110)

## 2019-05-04 PROCEDURE — 82962 GLUCOSE BLOOD TEST: CPT

## 2019-05-04 PROCEDURE — 99284 EMERGENCY DEPT VISIT MOD MDM: CPT

## 2019-05-04 RX ORDER — NYSTATIN 100000 [USP'U]/G
POWDER TOPICAL 2 TIMES DAILY
Qty: 60 G | Refills: 0 | Status: SHIPPED | OUTPATIENT
Start: 2019-05-04 | End: 2019-05-17

## 2019-05-04 RX ORDER — MUPIROCIN 20 MG/G
OINTMENT TOPICAL 3 TIMES DAILY
Qty: 15 G | Refills: 0 | Status: SHIPPED | OUTPATIENT
Start: 2019-05-04 | End: 2019-05-17
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
## 2019-05-04 NOTE — ED NOTES
APPEARANCE: Alert, oriented and in no acute distress.  CARDIAC: Normal rate and rhythm, no murmur heard.   PERIPHERAL VASCULAR: peripheral pulses present. Normal cap refill. No edema. Warm to touch.    RESPIRATORY:Normal rate and effort, breath sounds clear bilaterally throughout chest. Respirations are equal and unlabored no obvious signs of distress.  GASTRO: soft, bowel sounds normal, no tenderness, no abdominal distention.  MUSC: Full ROM. No bony tenderness or soft tissue tenderness. No obvious deformity.  SKIN: Skin is warm and dry, normal skin turgor, mucous membranes moist. dry skin noted to Bilateral plantar and heel   MENTAL STATUS: awake, alert and aware of environment.  EYE: PERRL, both eyes: pupils brisk and reactive to light. Normal size.  ENT: EARS: no obvious drainage. NOSE: no active bleeding.       Pt denies chest pain/SOB.

## 2019-05-04 NOTE — ED TRIAGE NOTES
Pt states his feet hurt, pain feels like burning sensation. Pt also states the skin on his feet is cracked.

## 2019-05-04 NOTE — ED PROVIDER NOTES
Encounter Date: 5/4/2019       History     Chief Complaint   Patient presents with    Foot Pain     Pt reports dry cracked feet with burning pains x few days.      José Luis Rolon, a 38 y.o. male  has no past medical history on file.     He presents to the ED evaluation of skin cracking and burning to both feet that has been present for about a week.  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  Denies any purulent drainage, increased redness or warmth.  Denies h/o of diabetes.  Treatments tried include use of tinactin with little improvement.            The history is provided by the patient.     Review of patient's allergies indicates:  No Known Allergies  No past medical history on file.  Past Surgical History:   Procedure Laterality Date    NOSE SURGERY       No family history on file.  Social History     Tobacco Use    Smoking status: Current Every Day Smoker     Packs/day: 0.50     Types: Cigarettes   Substance Use Topics    Alcohol use: No    Drug use: No     Review of Systems   Constitutional: Negative for fever.   Musculoskeletal: Positive for arthralgias (bilateral feet).   Skin: Positive for color change. Negative for wound.   Neurological: Negative for weakness and numbness.   All other systems reviewed and are negative.      Physical Exam     Initial Vitals [05/04/19 1331]   BP Pulse Resp Temp SpO2   113/64 78 14 98.1 °F (36.7 °C) 100 %      MAP       --         Physical Exam    Nursing note and vitals reviewed.  Constitutional: He appears well-developed and well-nourished.   HENT:   Head: Normocephalic and atraumatic.   Right Ear: External ear normal.   Left Ear: External ear normal.   Nose: Nose normal.   Eyes: EOM are normal.   Neck: Normal range of motion. Neck supple.   Cardiovascular: Normal rate and regular rhythm.   Pulmonary/Chest: Breath sounds normal. No respiratory distress.   Musculoskeletal: Normal range of motion.    Neurological: He is alert and oriented to person, place, and time.   Skin: Skin is warm and dry. Capillary refill takes less than 2 seconds.   Excessive peeling to soles of feet and between toes.  No wounds, erythema or drainage.     Psychiatric: He has a normal mood and affect. Thought content normal.         ED Course   Procedures  Labs Reviewed   POCT GLUCOSE - Abnormal; Notable for the following components:       Result Value    POCT Glucose 124 (*)     All other components within normal limits   POCT GLUCOSE MONITORING CONTINUOUS          Imaging Results    None          Medical Decision Making:   Initial Assessment:   Bilateral foot pain with skin rash  Differential Diagnosis:   Tinea pedis, cellulitis, corns   ED Management:  Patient presents to ED for evaluation of bilateral foot pain after frequent water exposure at work.  No open wounds, drainage or erythema.  symtpoms and exam consistent with tinea pedis.  Patient was instructed to keep area dry.  Will be given course of anti-fungal medication as well as anti-bacterial medication.  Instructed to f/u with podiatry for further evaluation.  Strict return precautions given and patient verbalized understanding.                          Clinical Impression:       ICD-10-CM ICD-9-CM   1. Tinea pedis of both feet B35.3 110.4                                Sofia Smith PA-C  05/04/19 1547

## 2019-05-17 ENCOUNTER — HOSPITAL ENCOUNTER (EMERGENCY)
Facility: HOSPITAL | Age: 39
Discharge: HOME | End: 2019-05-18
Attending: EMERGENCY MEDICINE
Payer: MEDICAID

## 2019-05-17 DIAGNOSIS — R51.9 ACUTE NONINTRACTABLE HEADACHE, UNSPECIFIED HEADACHE TYPE: Primary | ICD-10-CM

## 2019-05-17 DIAGNOSIS — F19.10 IV DRUG ABUSE: ICD-10-CM

## 2019-05-17 LAB
ALBUMIN SERPL BCP-MCNC: 3.4 G/DL (ref 3.5–5.2)
ALP SERPL-CCNC: 91 U/L (ref 55–135)
ALT SERPL W/O P-5'-P-CCNC: 82 U/L (ref 10–44)
ANION GAP SERPL CALC-SCNC: 8 MMOL/L (ref 8–16)
APTT BLDCRRT: 27.6 SEC (ref 21–32)
AST SERPL-CCNC: 58 U/L (ref 10–40)
BASOPHILS # BLD AUTO: 0.08 K/UL (ref 0–0.2)
BASOPHILS NFR BLD: 1.2 % (ref 0–1.9)
BILIRUB SERPL-MCNC: 0.4 MG/DL (ref 0.1–1)
BUN SERPL-MCNC: 12 MG/DL (ref 6–20)
CALCIUM SERPL-MCNC: 9.6 MG/DL (ref 8.7–10.5)
CHLORIDE SERPL-SCNC: 103 MMOL/L (ref 95–110)
CO2 SERPL-SCNC: 28 MMOL/L (ref 23–29)
CREAT SERPL-MCNC: 0.8 MG/DL (ref 0.5–1.4)
DIFFERENTIAL METHOD: ABNORMAL
EOSINOPHIL # BLD AUTO: 0.3 K/UL (ref 0–0.5)
EOSINOPHIL NFR BLD: 4.3 % (ref 0–8)
ERYTHROCYTE [DISTWIDTH] IN BLOOD BY AUTOMATED COUNT: 12.5 % (ref 11.5–14.5)
EST. GFR  (AFRICAN AMERICAN): >60 ML/MIN/1.73 M^2
EST. GFR  (NON AFRICAN AMERICAN): >60 ML/MIN/1.73 M^2
GLUCOSE SERPL-MCNC: 88 MG/DL (ref 70–110)
HCT VFR BLD AUTO: 42.2 % (ref 40–54)
HGB BLD-MCNC: 13.9 G/DL (ref 14–18)
IMM GRANULOCYTES # BLD AUTO: 0.01 K/UL (ref 0–0.04)
IMM GRANULOCYTES NFR BLD AUTO: 0.2 % (ref 0–0.5)
INR PPP: 0.9 (ref 0.8–1.2)
LACTATE SERPL-SCNC: 1.2 MMOL/L (ref 0.5–2.2)
LIPASE SERPL-CCNC: 8 U/L (ref 4–60)
LYMPHOCYTES # BLD AUTO: 2.8 K/UL (ref 1–4.8)
LYMPHOCYTES NFR BLD: 43.5 % (ref 18–48)
MCH RBC QN AUTO: 29.2 PG (ref 27–31)
MCHC RBC AUTO-ENTMCNC: 32.9 G/DL (ref 32–36)
MCV RBC AUTO: 89 FL (ref 82–98)
MONOCYTES # BLD AUTO: 0.7 K/UL (ref 0.3–1)
MONOCYTES NFR BLD: 10.5 % (ref 4–15)
NEUTROPHILS # BLD AUTO: 2.6 K/UL (ref 1.8–7.7)
NEUTROPHILS NFR BLD: 40.3 % (ref 38–73)
NRBC BLD-RTO: 0 /100 WBC
PLATELET # BLD AUTO: 189 K/UL (ref 150–350)
PMV BLD AUTO: 11.4 FL (ref 9.2–12.9)
POTASSIUM SERPL-SCNC: 3.8 MMOL/L (ref 3.5–5.1)
PROT SERPL-MCNC: 7.4 G/DL (ref 6–8.4)
PROTHROMBIN TIME: 9.7 SEC (ref 9–12.5)
RBC # BLD AUTO: 4.76 M/UL (ref 4.6–6.2)
SODIUM SERPL-SCNC: 139 MMOL/L (ref 136–145)
TSH SERPL DL<=0.005 MIU/L-ACNC: 0.99 UIU/ML (ref 0.4–4)
WBC # BLD AUTO: 6.46 K/UL (ref 3.9–12.7)

## 2019-05-17 PROCEDURE — 96374 THER/PROPH/DIAG INJ IV PUSH: CPT

## 2019-05-17 PROCEDURE — 99284 EMERGENCY DEPT VISIT MOD MDM: CPT | Mod: 25,,, | Performed by: EMERGENCY MEDICINE

## 2019-05-17 PROCEDURE — 85025 COMPLETE CBC W/AUTO DIFF WBC: CPT

## 2019-05-17 PROCEDURE — 99285 EMERGENCY DEPT VISIT HI MDM: CPT | Mod: 25

## 2019-05-17 PROCEDURE — 84443 ASSAY THYROID STIM HORMONE: CPT

## 2019-05-17 PROCEDURE — 85610 PROTHROMBIN TIME: CPT

## 2019-05-17 PROCEDURE — 83690 ASSAY OF LIPASE: CPT

## 2019-05-17 PROCEDURE — 85730 THROMBOPLASTIN TIME PARTIAL: CPT

## 2019-05-17 PROCEDURE — 80053 COMPREHEN METABOLIC PANEL: CPT

## 2019-05-17 PROCEDURE — 99284 PR EMERGENCY DEPT VISIT,LEVEL IV: ICD-10-PCS | Mod: 25,,, | Performed by: EMERGENCY MEDICINE

## 2019-05-17 PROCEDURE — 62270 DX LMBR SPI PNXR: CPT

## 2019-05-17 PROCEDURE — 63600175 PHARM REV CODE 636 W HCPCS: Performed by: EMERGENCY MEDICINE

## 2019-05-17 PROCEDURE — 86703 HIV-1/HIV-2 1 RESULT ANTBDY: CPT

## 2019-05-17 PROCEDURE — 83605 ASSAY OF LACTIC ACID: CPT

## 2019-05-17 PROCEDURE — 62270 DX LMBR SPI PNXR: CPT | Mod: ,,, | Performed by: EMERGENCY MEDICINE

## 2019-05-17 PROCEDURE — 62270 PR SPINAL PUNCTURE,LUMBAR,DIAGNOSTIC: ICD-10-PCS | Mod: ,,, | Performed by: EMERGENCY MEDICINE

## 2019-05-17 RX ORDER — METOCLOPRAMIDE HYDROCHLORIDE 5 MG/ML
10 INJECTION INTRAMUSCULAR; INTRAVENOUS
Status: COMPLETED | OUTPATIENT
Start: 2019-05-17 | End: 2019-05-17

## 2019-05-17 RX ORDER — KETOROLAC TROMETHAMINE 30 MG/ML
30 INJECTION, SOLUTION INTRAMUSCULAR; INTRAVENOUS
Status: DISCONTINUED | OUTPATIENT
Start: 2019-05-17 | End: 2019-05-17

## 2019-05-17 RX ADMIN — METOCLOPRAMIDE 10 MG: 5 INJECTION, SOLUTION INTRAMUSCULAR; INTRAVENOUS at 10:05

## 2019-05-17 RX ADMIN — IOHEXOL 75 ML: 350 INJECTION, SOLUTION INTRAVENOUS at 11:05

## 2019-05-18 VITALS
DIASTOLIC BLOOD PRESSURE: 80 MMHG | TEMPERATURE: 98 F | RESPIRATION RATE: 18 BRPM | SYSTOLIC BLOOD PRESSURE: 118 MMHG | BODY MASS INDEX: 26.52 KG/M2 | OXYGEN SATURATION: 99 % | HEIGHT: 66 IN | HEART RATE: 67 BPM | WEIGHT: 165 LBS

## 2019-05-18 LAB
BILIRUB UR QL STRIP: NEGATIVE
CLARITY CSF: CLEAR
CLARITY CSF: CLEAR
CLARITY UR REFRACT.AUTO: CLEAR
COLOR CSF: COLORLESS
COLOR CSF: COLORLESS
COLOR UR AUTO: YELLOW
GLUCOSE CSF-MCNC: 61 MG/DL (ref 40–70)
GLUCOSE UR QL STRIP: NEGATIVE
HGB UR QL STRIP: NEGATIVE
KETONES UR QL STRIP: NEGATIVE
LEUKOCYTE ESTERASE UR QL STRIP: NEGATIVE
LYMPHOCYTES NFR CSF MANUAL: 50 % (ref 40–80)
NEUTROPHILS NFR CSF MANUAL: 50 % (ref 0–6)
NITRITE UR QL STRIP: NEGATIVE
PH UR STRIP: 5 [PH] (ref 5–8)
PROT CSF-MCNC: 12 MG/DL (ref 15–40)
PROT UR QL STRIP: NEGATIVE
RBC # CSF: 0 /CU MM
RBC # CSF: 3 /CU MM
SP GR UR STRIP: >=1.03 (ref 1–1.03)
SPECIMEN VOL CSF: 1 ML
SPECIMEN VOL CSF: 1 ML
URN SPEC COLLECT METH UR: ABNORMAL
WBC # CSF: 0 /CU MM (ref 0–5)
WBC # CSF: 2 /CU MM (ref 0–5)

## 2019-05-18 PROCEDURE — 25500020 PHARM REV CODE 255: Performed by: EMERGENCY MEDICINE

## 2019-05-18 PROCEDURE — 81003 URINALYSIS AUTO W/O SCOPE: CPT

## 2019-05-18 PROCEDURE — 87102 FUNGUS ISOLATION CULTURE: CPT

## 2019-05-18 PROCEDURE — 84157 ASSAY OF PROTEIN OTHER: CPT

## 2019-05-18 PROCEDURE — 87070 CULTURE OTHR SPECIMN AEROBIC: CPT

## 2019-05-18 PROCEDURE — 82945 GLUCOSE OTHER FLUID: CPT

## 2019-05-18 PROCEDURE — 99000 SPECIMEN HANDLING OFFICE-LAB: CPT

## 2019-05-18 PROCEDURE — 87205 SMEAR GRAM STAIN: CPT

## 2019-05-18 PROCEDURE — 89051 BODY FLUID CELL COUNT: CPT | Mod: 91

## 2019-05-18 PROCEDURE — 62270 DX LMBR SPI PNXR: CPT

## 2019-05-18 PROCEDURE — 87491 CHLMYD TRACH DNA AMP PROBE: CPT

## 2019-05-18 NOTE — PROCEDURES - EMERGENCY DEPT.
Lumbar Puncture  Date/Time: 5/18/2019 5:51 AM  Location procedure was performed: [facility] EMERGENCY DEPARTMENT  Performed by: Marvin Aburto MD  Authorized by: Sanjeev Shah MD   Assisting provider: Murtaza Root MD  Pre-operative diagnosis:  Headache  Post-operative diagnosis: headache  Consent Done: Yes  Indications: evaluation for infection  Anesthesia: local infiltration    Anesthesia:  Local Anesthetic: lidocaine 1% without epinephrine  Anesthetic total: 3 mL  Patient sedated: no  Preparation: Patient was prepped and draped in the usual sterile fashion.  Lumbar space: L3-L4 interspace  Patient's position: sitting  Needle gauge: 22  Needle type: spinal needle - Quincke tip  Needle length: 3.5 in  Number of attempts: 1  Fluid appearance: clear  Tubes of fluid: 4  Total volume: 4 ml  Post-procedure: site cleaned and adhesive bandage applied  Complications: No  Estimated blood loss (mL): 0  Specimens: Yes  Implants: No  Patient tolerance: Patient tolerated the procedure well with no immediate complications

## 2019-05-18 NOTE — ED NOTES
Pt resting in bed. No acute distress noted. Respirations even and unlabored. No new complaints voiced. Updated on plan of care. Side rails up x2. Call light within reach. Will continue to monitor.

## 2019-05-18 NOTE — ED NOTES
0555:  Notified by the lab with a restaging and the CSF fluid and found rare white blood cells and rare yeast.  I reviewed the cell counts and results.  I strongly doubt this patient has meningitis of any etiology.  He will follow up with his regular physician for persistent headaches.     Marvin Aburto MD  05/18/19 0556

## 2019-05-18 NOTE — ED PROVIDER NOTES
Encounter Date: 5/17/2019       History     Chief Complaint   Patient presents with    Headache     C/o HA and feeling tired for the last couple days. States he left work early yesterday and was told he can't go back until he gets a doctor's excuse. hospitals doesn't have PCP.      37 yo M with history of IV drug abuse and tobacco abuse, presenting with 2 day history of headache. Pt describes as right sided temporal dull headache, 3 of 10 in intensity, associated with photophobia. Took ibuprofen at home which provided minimal relief. Patient was sent home from work yesterday due to the headache and needs a medical note to return to work. On ROS patient also endorses feeling generalized weakness for past several months, generalized joint points, burning with urination for past month. Requesting HIV and Hepatitis testing due to hx of IV drug use. Reports he is sexually active with only his wife. Reports last heroin use this morning.         Review of patient's allergies indicates:  No Known Allergies  History reviewed. No pertinent past medical history.  Past Surgical History:   Procedure Laterality Date    NOSE SURGERY       History reviewed. No pertinent family history.  Social History     Tobacco Use    Smoking status: Current Every Day Smoker     Packs/day: 0.50     Types: Cigarettes    Smokeless tobacco: Never Used   Substance Use Topics    Alcohol use: No    Drug use: Yes     Types: IV     Comment: heroin at 1200     Review of Systems   Constitutional: Positive for fatigue. Negative for chills, diaphoresis and fever.   HENT: Negative for congestion.    Eyes: Positive for photophobia. Negative for visual disturbance.   Respiratory: Negative for cough and shortness of breath.    Cardiovascular: Negative for chest pain, palpitations and leg swelling.   Gastrointestinal: Positive for abdominal pain. Negative for constipation, diarrhea, nausea and vomiting.   Genitourinary: Positive for dysuria. Negative for  difficulty urinating.   Musculoskeletal: Positive for arthralgias. Negative for neck pain and neck stiffness.   Skin: Negative for wound.   Neurological: Positive for weakness and headaches. Negative for dizziness, tremors, seizures, syncope, light-headedness and numbness.       Physical Exam     Initial Vitals [05/17/19 1906]   BP Pulse Resp Temp SpO2   134/70 93 16 98.1 °F (36.7 °C) 95 %      MAP       --         Physical Exam    Constitutional: He appears well-developed and well-nourished. He is not diaphoretic. No distress.   HENT:   Head: Normocephalic and atraumatic.   Mouth/Throat: Oropharynx is clear and moist. No oropharyngeal exudate.   Eyes: EOM are normal. Pupils are equal, round, and reactive to light. No scleral icterus.   Pupils constricted 2-3mm    Neck: Normal range of motion. Neck supple. No thyromegaly present.   Cardiovascular: Normal rate, regular rhythm and normal heart sounds.   No murmur heard.  Pulmonary/Chest: Breath sounds normal. No respiratory distress.   Abdominal: Soft. Bowel sounds are normal. He exhibits mass (RUQ hepatomegaly ). There is tenderness (RUQ).   Musculoskeletal: Normal range of motion. He exhibits no edema or tenderness.   Neurological: He is alert and oriented to person, place, and time. He has normal strength.   Skin: Skin is warm and dry.         ED Course   Procedures  Labs Reviewed   CBC W/ AUTO DIFFERENTIAL - Abnormal; Notable for the following components:       Result Value    Hemoglobin 13.9 (*)     All other components within normal limits   COMPREHENSIVE METABOLIC PANEL - Abnormal; Notable for the following components:    Albumin 3.4 (*)     AST 58 (*)     ALT 82 (*)     All other components within normal limits   URINALYSIS, REFLEX TO URINE CULTURE - Abnormal; Notable for the following components:    Specific Gravity, UA >=1.030 (*)     All other components within normal limits    Narrative:     Preferred Collection Type->Urine, Clean Catch   C. TRACHOMATIS/N.  GONORRHOEAE BY AMP DNA   GRAM STAIN   CULTURE, CSF  (INCLUDES STAIN)   CULTURE, FUNGUS   TSH   LIPASE   APTT   PROTIME-INR   LACTIC ACID, PLASMA   HIV 1 / 2 ANTIBODY   CSF CELL COUNT WITH DIFFERENTIAL   PROTEIN, CSF   GLUCOSE, CSF   CSF CELL COUNT WITH DIFFERENTIAL   FREEZE AND HOLD -           Imaging Results          CT Head W WO Contrast (Final result)  Result time 05/17/19 23:57:42   Procedure changed from CT Head With Contrast     Final result by Judith Atkins MD (05/17/19 23:57:42)                 Impression:      No CT evidence of acute intracranial abnormality. If the patient's headaches are sufficiently clinically suspicious, or associated with signs of elevated ICP, focal neurologic deficits, nausea, or vomiting, further evaluation with MRI is recommended if there are no clinical contraindications for more definitive evaluation.      Electronically signed by: Judith Atkins MD  Date:    05/17/2019  Time:    23:57             Narrative:    EXAMINATION:  CT HEAD WITH AND WITHOUT    CLINICAL HISTORY:  headache with suspicion for infectious etiology due to hx of IV drug use;    TECHNIQUE:  Low dose axial images were obtained through the head before and after the administration of 75 cc omni 350 IV contrast.  Coronal and sagittal reformations were also performed. Contrast was not administered.    COMPARISON:  None.    FINDINGS:  There is no acute intracranial hemorrhage, hydrocephalus, midline shift or mass effect. Gray-white matter differentiation appears maintained. The basal cisterns are patent.  Allowing for limitation of CT technique, no abnormal postcontrast enhancement is visualized.  The mastoid air cells and paranasal sinuses are clear of acute process. The visualized bones of the calvarium demonstrate no acute osseous abnormality.                                 Medical Decision Making:   Initial Assessment:   39 yo M with history of IV drug use, Hepatitis C, presenting with c/o headache. Also  endorsing photophobia, generalized weakness, dysuria, arthralgia. HPI concerning for possible infectious etiology of symptoms.   Differential Diagnosis:   DDx includes cluster headache, migraine, meningitis, encephalitis       APC / Resident Notes:   9:10 PM  Labs including cmp, tsh, hiv ordered. CT-head w/contrast ordered to r/o brain lesions given patient with history of hepC and active IVDU.   12:29 AM  Labs not revealing for etiology of headache and chronic fatigue. CT head without acute abnormality. Patient reexamined reports subjective improvement in headache but still feels fatigue and weak. Will plan for lumbar puncture to r/o meningitis or encephalitis.   2:50 AM  LP performed by Dr. Aburto at bedside without complications. CSF sent to lab.     Murtaza Root MD  Resident Physician, PGY1                   Clinical Impression:       ICD-10-CM ICD-9-CM   1. Acute nonintractable headache, unspecified headache type R51 784.0   2. IV drug abuse F19.10 305.90                                Murtaza Root MD  Resident  05/18/19 0258

## 2019-05-18 NOTE — ED TRIAGE NOTES
Patient states right sided headache x 2 days, Ibuprofen at 1500, asleep in lobby, speech slurred during INTAKE interview. Last IV Heroin use at 1200

## 2019-05-18 NOTE — DISCHARGE INSTRUCTIONS
Take motrin and tylenol over the counter as directed on packaging as needed for pain.    Our goal in the emergency department is to always give you outstanding care and exceptional service. You may receive a survey by mail or e-mail in the next week regarding your experience in our ED. We would greatly appreciate your completing and returning the survey. Your feedback provides us with a way to recognize our staff who give very good care and it helps us learn how to improve when your experience was below our aspiration of excellence.

## 2019-05-18 NOTE — ED NOTES
Contacted by Micro at 1305 on 5/18/19 regarding Results from LP. Spinal Fluid was re stained and showed rare WBCs and rare Yeast. Dr. Aburto notified of this.

## 2019-05-18 NOTE — ED NOTES
Patient identifiers verified and correct for Mr Rolon  C/C: Headache SEE NN  APPEARANCE: awake, speech slurred, eyes closed.   SKIN: warm, dry and intact. No breakdown or bruising.  MUSCULOSKELETAL: Patient moving all extremities spontaneously, no obvious swelling or deformities noted. Ambulates independently.  RESPIRATORY: Denies shortness of breath.Respirations unlabored.   CARDIAC: Denies CP, 2+ distal pulses; no peripheral edema  ABDOMEN: S/ND/NT, Denies nausea  : voids spontaneously, denies difficulty  Neurologic: Alert; follows commands; denies numbness/tingling. Positive dizziness, lightheaded, positive right headache

## 2019-05-19 NOTE — ED NOTES
Called patient to check on him.  D/W Son.  He states patient is improved.  Instructed son to have patient call the ED if he has an issues, concerns, or return of symptoms.     Marvin Aburto MD  05/18/19 8776

## 2019-05-20 ENCOUNTER — HOSPITAL ENCOUNTER (EMERGENCY)
Facility: HOSPITAL | Age: 39
Discharge: HOME | End: 2019-05-20
Attending: EMERGENCY MEDICINE
Payer: MEDICAID

## 2019-05-20 VITALS
HEART RATE: 62 BPM | RESPIRATION RATE: 18 BRPM | WEIGHT: 165 LBS | DIASTOLIC BLOOD PRESSURE: 69 MMHG | TEMPERATURE: 99 F | SYSTOLIC BLOOD PRESSURE: 111 MMHG | BODY MASS INDEX: 26.52 KG/M2 | OXYGEN SATURATION: 99 % | HEIGHT: 66 IN

## 2019-05-20 DIAGNOSIS — R51.9 NONINTRACTABLE HEADACHE, UNSPECIFIED CHRONICITY PATTERN, UNSPECIFIED HEADACHE TYPE: Primary | ICD-10-CM

## 2019-05-20 DIAGNOSIS — E86.0 DEHYDRATION: ICD-10-CM

## 2019-05-20 LAB
C TRACH DNA SPEC QL NAA+PROBE: NOT DETECTED
HIV 1+2 AB+HIV1 P24 AG SERPL QL IA: NEGATIVE
N GONORRHOEA DNA SPEC QL NAA+PROBE: NOT DETECTED

## 2019-05-20 PROCEDURE — 63600175 PHARM REV CODE 636 W HCPCS: Performed by: EMERGENCY MEDICINE

## 2019-05-20 PROCEDURE — 25000003 PHARM REV CODE 250: Performed by: EMERGENCY MEDICINE

## 2019-05-20 PROCEDURE — 96374 THER/PROPH/DIAG INJ IV PUSH: CPT

## 2019-05-20 PROCEDURE — 99284 EMERGENCY DEPT VISIT MOD MDM: CPT | Mod: ,,, | Performed by: EMERGENCY MEDICINE

## 2019-05-20 PROCEDURE — 99284 EMERGENCY DEPT VISIT MOD MDM: CPT

## 2019-05-20 PROCEDURE — 99284 PR EMERGENCY DEPT VISIT,LEVEL IV: ICD-10-PCS | Mod: ,,, | Performed by: EMERGENCY MEDICINE

## 2019-05-20 PROCEDURE — 96361 HYDRATE IV INFUSION ADD-ON: CPT

## 2019-05-20 PROCEDURE — 96375 TX/PRO/DX INJ NEW DRUG ADDON: CPT

## 2019-05-20 RX ORDER — DIPHENHYDRAMINE HYDROCHLORIDE 50 MG/ML
12.5 INJECTION INTRAMUSCULAR; INTRAVENOUS
Status: COMPLETED | OUTPATIENT
Start: 2019-05-20 | End: 2019-05-20

## 2019-05-20 RX ORDER — METOCLOPRAMIDE HYDROCHLORIDE 5 MG/ML
10 INJECTION INTRAMUSCULAR; INTRAVENOUS
Status: COMPLETED | OUTPATIENT
Start: 2019-05-20 | End: 2019-05-20

## 2019-05-20 RX ADMIN — DIPHENHYDRAMINE HYDROCHLORIDE 12.5 MG: 50 INJECTION INTRAMUSCULAR; INTRAVENOUS at 07:05

## 2019-05-20 RX ADMIN — METOCLOPRAMIDE 10 MG: 5 INJECTION, SOLUTION INTRAMUSCULAR; INTRAVENOUS at 07:05

## 2019-05-20 RX ADMIN — SODIUM CHLORIDE 1000 ML: 0.9 INJECTION, SOLUTION INTRAVENOUS at 07:05

## 2019-05-20 NOTE — ED NOTES
Monitoring CSF cultures.  No growth to date for bacteria or fungus.  Will continue to follow until final.     Marvin Aburto MD  05/20/19 1756

## 2019-05-20 NOTE — ED NOTES
Pt identifiers checked and accurate with José Luis Rolon    Pt reports to ED with complaints of headache since Thursday. Pt reports being seen in ED, LP performed, no relief from symptoms when discharged. Pt reports N/V, photosensitivity and neck stiffness. Pt admits to using IV heroin earlier today around 12 pm. Pt reports falling forward out of chair 1 month ago, hitting forehead, seeking medical attention at the time. Pt denies recent falls, dizziness, weakness, numbness, tingling, vision changes, CP, SOB, fever, chills.     LOC: The patient is awake, alert and aware of environment with an appropriate affect, the patient is oriented x 3 and speaking appropriately.  APPEARANCE: Patient seated with eyes closed throughout assessment, pt states discomfort, requesting to be laid back and feet propped up on exam chair, patient family member presents throughout exam.   SKIN: The skin is warm and dry, color consistent with ethnicity, patient has normal skin turgor and moist mucus membranes, skin intact  MUSCULOSKELETAL: Patient moving all extremities well, no obvious swelling or deformities noted. Pt presents in wheelchair, pt able to transfer to exam chair without assistance with steady gait.   RESPIRATORY: Airway is open and patent; respirations are spontaneous, patient has a normal effort and rate, no accessory muscle use noted.   CARDIAC: No complaints of chest pain at this time.   ABDOMEN: Soft and non tender to palpation, no distention noted. Bowel sounds present x 4. Pt denies N/V/D  NEUROLOGIC: PERRL, 3 mm bilaterally, eyes open spontaneously, behavior appropriate to situation, follows commands, purposeful motor response noted. Pt reports headache, denies numbness, tingling, dizziness.

## 2019-05-20 NOTE — ED PROVIDER NOTES
Encounter Date: 5/20/2019    SCRIBE #1 NOTE: I, Son Melinda, am scribing for, and in the presence of,  Dr. Michel . I have scribed the entire note.       History     Chief Complaint   Patient presents with    Headache     LP done on friday, still having headache     38 y.o. male with history of IV drug use presenting with headache with associated photophobia, nausea, and vomiting for the past 3 days. Patient was seen on 5/18/2019 for similar complaint where he had a lumbar puncture performed. Shortly after he was discharged, he began having the pain again and has been constant since. He notes that his pain is mildly alleviated when he lies down. He reports of decreased appetite and poor PO intake due to the pain. He also reports of neck pain and stiffness. No fever. Denies urinary symptoms. Denies cough, sore throat, nasal congestion.     The history is provided by the patient and medical records.     Review of patient's allergies indicates:  No Known Allergies  History reviewed. No pertinent past medical history.  Past Surgical History:   Procedure Laterality Date    NOSE SURGERY       History reviewed. No pertinent family history.  Social History     Tobacco Use    Smoking status: Current Every Day Smoker     Packs/day: 0.50     Types: Cigarettes    Smokeless tobacco: Never Used   Substance Use Topics    Alcohol use: No    Drug use: Yes     Types: IV     Comment: 12 pm today      Review of Systems   Constitutional: Positive for appetite change. Negative for fever.   HENT: Negative for congestion and sore throat.    Eyes: Positive for photophobia.   Respiratory: Negative for cough.    Cardiovascular: Negative for chest pain.   Gastrointestinal: Positive for nausea and vomiting. Negative for abdominal pain.   Genitourinary: Negative for difficulty urinating.   Musculoskeletal: Positive for neck pain and neck stiffness.   Skin: Negative for rash.   Neurological: Positive for headaches.       Physical Exam      Initial Vitals [05/20/19 1616]   BP Pulse Resp Temp SpO2   118/70 (!) 50 18 97.6 °F (36.4 °C) 97 %      MAP       --         Physical Exam    Nursing note and vitals reviewed.  Constitutional: He appears well-developed and well-nourished. No distress.   HENT:   Head: Normocephalic and atraumatic.   Right Ear: External ear normal.   Left Ear: External ear normal.   Mouth/Throat: Oropharynx is clear and moist.   Mild photophobia    Eyes: EOM are normal. Pupils are equal, round, and reactive to light.   Neck: Normal range of motion. Neck supple.   No meningismus   Cardiovascular: Normal rate, regular rhythm and normal heart sounds. Exam reveals no gallop and no friction rub.    No murmur heard.  Pulmonary/Chest: Breath sounds normal. No respiratory distress. He has no wheezes. He has no rhonchi. He has no rales.   Abdominal: Soft. He exhibits no distension. There is no tenderness.   Musculoskeletal: Normal range of motion.   Neurological: He is alert and oriented to person, place, and time. No cranial nerve deficit or sensory deficit.   Skin: Skin is warm and dry.   Psychiatric: His behavior is normal. Thought content normal.         ED Course   Procedures  Labs Reviewed - No data to display       Imaging Results    None          Medical Decision Making:   History:   Old Medical Records: I decided to obtain old medical records.  Old Records Summarized: other records.       <> Summary of Records: Patient presented on the 17 with 2 day history of headache with photophobia. He had a lumbar puncture done. CSF culture has showed no organisms  No growth to date for bacteria or fungus.  Initial Assessment:   39 y/o man seen here 3 days ago with headache nausea, and photophobia s/p LP now presents with positional headache. Most likely post LP headache. He still has nausea and poor PO intake which likely exacerbated this. A review of his CSF cultures showed no growth. I am not concern about meningitis currently. Will  hydrate, give antiemetics, and reassess.               Attending Attestation:           Physician Attestation for Scribe:      Comments: I, Dr. Rizwana Daniel, personally performed the services described in this documentation. All medical record entries made by the scribe were at my direction and in my presence.  I have reviewed the chart and agree that the record reflects my personal performance and is accurate and complete. Rizwana Daniel MD.  8:43 PM 05/20/2019      Attending ED Notes:   8:42 PM  At the end of my shift patient is still receiving IV fluids however he has received Reglan and Benadryl and I have given him a Coca-Cola to drink.  He reports his headache is much better even upon standing.  I am signing over his care to my colleague, Dr. Roach.             Clinical Impression:       ICD-10-CM ICD-9-CM   1. Nonintractable headache, unspecified chronicity pattern, unspecified headache type R51 784.0                                Rizwana Daniel MD  05/20/19 2043

## 2019-05-21 NOTE — DISCHARGE INSTRUCTIONS
Please return here or go to the Emergency Department for any concerns or worsening of condition.  If you were prescribed antibiotics, please take them to completion.  If you were prescribed a narcotic medication, do not drive or operate heavy equipment or machinery while taking these medications.  Please drink plenty of fluids.  Please get plenty of rest.  If not allergic, please take over the counter Tylenol (Acetaminophen) and/or Motrin (Ibuprofen) as directed for control of pain and/or fever.  Please follow up with your primary care doctor or specialist as needed.    If you  smoke, please stop smoking.

## 2019-05-23 LAB
BACTERIA CSF CULT: NO GROWTH
GRAM STN SPEC: NORMAL

## 2019-06-12 ENCOUNTER — HOSPITAL ENCOUNTER (EMERGENCY)
Facility: HOSPITAL | Age: 39
Discharge: HOME | End: 2019-06-13
Attending: EMERGENCY MEDICINE
Payer: MEDICAID

## 2019-06-12 DIAGNOSIS — R07.89 LEFT-SIDED CHEST WALL PAIN: Primary | ICD-10-CM

## 2019-06-12 PROCEDURE — 99284 EMERGENCY DEPT VISIT MOD MDM: CPT | Mod: 25

## 2019-06-12 PROCEDURE — 93005 ELECTROCARDIOGRAM TRACING: CPT

## 2019-06-13 VITALS
RESPIRATION RATE: 20 BRPM | HEART RATE: 83 BPM | SYSTOLIC BLOOD PRESSURE: 144 MMHG | TEMPERATURE: 98 F | HEIGHT: 66 IN | OXYGEN SATURATION: 99 % | WEIGHT: 150 LBS | DIASTOLIC BLOOD PRESSURE: 101 MMHG | BODY MASS INDEX: 24.11 KG/M2

## 2019-06-13 PROCEDURE — 25000003 PHARM REV CODE 250: Performed by: PHYSICIAN ASSISTANT

## 2019-06-13 RX ORDER — ORPHENADRINE CITRATE 100 MG/1
100 TABLET, EXTENDED RELEASE ORAL
Status: COMPLETED | OUTPATIENT
Start: 2019-06-13 | End: 2019-06-13

## 2019-06-13 RX ORDER — ORPHENADRINE CITRATE 100 MG/1
100 TABLET, EXTENDED RELEASE ORAL 2 TIMES DAILY
Qty: 14 TABLET | Refills: 0 | Status: SHIPPED | OUTPATIENT
Start: 2019-06-13 | End: 2019-06-20

## 2019-06-13 RX ORDER — KETOROLAC TROMETHAMINE 10 MG/1
10 TABLET, FILM COATED ORAL
Status: COMPLETED | OUTPATIENT
Start: 2019-06-13 | End: 2019-06-13

## 2019-06-13 RX ORDER — NAPROXEN 500 MG/1
500 TABLET ORAL 2 TIMES DAILY WITH MEALS
Qty: 30 TABLET | Refills: 0 | OUTPATIENT
Start: 2019-06-13 | End: 2019-12-17

## 2019-06-13 RX ADMIN — KETOROLAC TROMETHAMINE 10 MG: 10 TABLET, FILM COATED ORAL at 12:06

## 2019-06-13 RX ADMIN — ORPHENADRINE CITRATE 100 MG: 100 TABLET, EXTENDED RELEASE ORAL at 12:06

## 2019-06-13 NOTE — ED PROVIDER NOTES
"Encounter Date: 6/12/2019       History     Chief Complaint   Patient presents with    Chest Pain     To ER per EMS with c/o left shoulder and upper chest pain that increases with movement. Denies SOB.    Shoulder Pain     37 y/o M presents to the ED for chest pain x today. Reports that pain started at rest. He describes chest pain as "sharp", with some associated discomfort to left acromioclavicular region. Denies any additional complaints at this time. No fever, nausea, vomiting, SOB, diaphoresis, jaw pain, numbness/tingling, leg swelling, leg pain, hemoptysis, trauma/falls, or recent long immobilization. PMHx of illicit drug abuse. Denies drug use today, endorses IV heroin use yesterday.    The history is provided by the patient. No  was used.     Review of patient's allergies indicates:  No Known Allergies  History reviewed. No pertinent past medical history.  Past Surgical History:   Procedure Laterality Date    NOSE SURGERY       History reviewed. No pertinent family history.  Social History     Tobacco Use    Smoking status: Current Every Day Smoker     Packs/day: 0.50     Types: Cigarettes    Smokeless tobacco: Never Used   Substance Use Topics    Alcohol use: No    Drug use: Yes     Types: IV     Comment: 12 pm today      Review of Systems   Constitutional: Negative for appetite change, chills, diaphoresis and fever.   Respiratory: Negative for cough and shortness of breath.    Cardiovascular: Positive for chest pain. Negative for palpitations and leg swelling.   Gastrointestinal: Negative for abdominal pain, diarrhea, nausea and vomiting.   Musculoskeletal: Negative for neck pain.   All other systems reviewed and are negative.      Physical Exam     Initial Vitals [06/12/19 2149]   BP Pulse Resp Temp SpO2   129/87 88 18 98.4 °F (36.9 °C) 96 %      MAP       --         Physical Exam    Nursing note and vitals reviewed.  Constitutional: He appears well-developed and " well-nourished. No distress.   Patient appears to be under the influence. He will not open his eyes.     HENT:   Head: Normocephalic and atraumatic.   Nose: Nose normal.   Eyes: Conjunctivae are normal.   Neck: Normal range of motion.   Cardiovascular: Normal rate, regular rhythm and normal heart sounds.   Pulmonary/Chest: Effort normal and breath sounds normal. He exhibits tenderness (to left pectoralis muscle). He exhibits no bony tenderness.       Increase pain with certain left arm movements   Abdominal: Soft. Bowel sounds are normal.   Musculoskeletal: Normal range of motion.   Neurological: He is alert and oriented to person, place, and time.   Skin: Skin is warm and dry.   Psychiatric: He has a normal mood and affect. His speech is normal and behavior is normal. Judgment and thought content normal. Cognition and memory are normal.         ED Course   Procedures  Labs Reviewed - No data to display  EKG Readings: (Independently Interpreted)   Rhythm: Normal Sinus Rhythm. Heart Rate: 83. Ectopy: No Ectopy. Conduction: Normal. ST Segments: Normal ST Segments. T Waves: Normal. Clinical Impression: Normal Sinus Rhythm       Imaging Results          X-Ray Chest PA And Lateral (Final result)  Result time 06/12/19 23:55:54    Final result by Hiram Borden MD (06/12/19 23:55:54)                 Impression:      No acute process.      Electronically signed by: Hiram Borden MD  Date:    06/12/2019  Time:    23:55             Narrative:    EXAMINATION:  XR CHEST PA AND LATERAL    CLINICAL HISTORY:  Chest pain, unspecified    TECHNIQUE:  PA and lateral views of the chest were performed.    COMPARISON:  CT scan of the chest dated 06/19/2018.    FINDINGS:  Monitoring EKG leads are present.  The trachea is unremarkable.  The cardiomediastinal silhouette is within normal limits.  The hilar structures are unremarkable.  The hemidiaphragms are within normal limits.  There is no evidence of free air beneath the hemidiaphragms.   There are no pleural effusions.  There is no evidence of a pneumothorax.  There is an azygos fissure.  There is no evidence of pneumomediastinum.  No airspace opacity is identified.  The osseous structures are unremarkable.                                 Medical Decision Making:   Initial Assessment:   37 yo male with left upper chest wall pain x today worsened by movement or palpation. No medical conditions, hx of IV drug use, patient endorses heroin use yesterday.   Differential Diagnosis:   Chest wall pain, muscle strain  Clinical Tests:   Radiological Study: Ordered and Reviewed  Medical Tests: Ordered and Reviewed  ED Management:  I do not suspect a cardiac origin for the patients pain as it is reproducible on exam, EKG is normal sinus rhythm, and patient has been afebrile with stable VS. Chest x-ray is negative for acute processes. Patient given norflex and Toradol in ED.  He will be discharged with Norflex and naproxen and instructed to follow up with his PCP.  Discharge vitals show a BP of 144/101.  This appears to be the 1st hypertensive reading per my chart review.  Patient will follow up with PCP in 2-3 days if symptoms are not improving.  Strict return precautions given.  He states understanding and agreement treatment plan.  Other:   I have discussed this case with another health care provider.                      Clinical Impression:       ICD-10-CM ICD-9-CM   1. Left-sided chest wall pain R07.89 786.52                                Melvina Brown PA-C  06/13/19 0124

## 2019-06-13 NOTE — ED NOTES
To ER with c/o left upper chest and shoulder pain.  Pt denies injury at this time.  Yells out in pain and guarding when left lateral chest under arm is touched.  Pt states that pain increases with talking and respirations.  Pt will not open eyes when talking and was sleeping in lobby.  Pt admits to IV  heroin use but denies use today, states that he did not have any.  resp even and unlabored.  Lungs clear and equal.  abd soft and non tender with + BS noted.  Urine sample requested and pt states that he will try.

## 2019-06-13 NOTE — ED NOTES
Patient is asleep on stretcher. Respirations even and unlabored, equal rise and fall of chest noted. NAD noted. Call light in reach. Will continue to monitor.

## 2019-06-13 NOTE — DISCHARGE INSTRUCTIONS
Ice painful area, rest. Follow up with PCP in 2-3 days if pain does not improve. Return to the ER with any new or worsening symptoms

## 2019-06-18 LAB — FUNGUS SPEC CULT: NORMAL

## 2019-07-23 ENCOUNTER — HOSPITAL ENCOUNTER (EMERGENCY)
Facility: HOSPITAL | Age: 39
Discharge: HOME | End: 2019-07-23
Attending: EMERGENCY MEDICINE
Payer: MEDICAID

## 2019-07-23 VITALS
WEIGHT: 150 LBS | SYSTOLIC BLOOD PRESSURE: 115 MMHG | HEIGHT: 66 IN | HEART RATE: 90 BPM | RESPIRATION RATE: 18 BRPM | OXYGEN SATURATION: 96 % | DIASTOLIC BLOOD PRESSURE: 71 MMHG | BODY MASS INDEX: 24.11 KG/M2 | TEMPERATURE: 99 F

## 2019-07-23 DIAGNOSIS — M76.61 ACHILLES TENDINITIS OF RIGHT LOWER EXTREMITY: Primary | ICD-10-CM

## 2019-07-23 DIAGNOSIS — M79.604 LEG PAIN, RIGHT: ICD-10-CM

## 2019-07-23 DIAGNOSIS — M25.571 ANKLE PAIN, RIGHT: ICD-10-CM

## 2019-07-23 LAB
BASOPHILS # BLD AUTO: 0.02 K/UL (ref 0–0.2)
BASOPHILS NFR BLD: 0.4 % (ref 0–1.9)
CRP SERPL-MCNC: 20.8 MG/L (ref 0–8.2)
DIFFERENTIAL METHOD: ABNORMAL
EOSINOPHIL # BLD AUTO: 0.2 K/UL (ref 0–0.5)
EOSINOPHIL NFR BLD: 3.7 % (ref 0–8)
ERYTHROCYTE [DISTWIDTH] IN BLOOD BY AUTOMATED COUNT: 13.3 % (ref 11.5–14.5)
HCT VFR BLD AUTO: 42.5 % (ref 40–54)
HGB BLD-MCNC: 13.7 G/DL (ref 14–18)
LYMPHOCYTES # BLD AUTO: 1.7 K/UL (ref 1–4.8)
LYMPHOCYTES NFR BLD: 31.1 % (ref 18–48)
MCH RBC QN AUTO: 28.8 PG (ref 27–31)
MCHC RBC AUTO-ENTMCNC: 32.2 G/DL (ref 32–36)
MCV RBC AUTO: 90 FL (ref 82–98)
MONOCYTES # BLD AUTO: 0.5 K/UL (ref 0.3–1)
MONOCYTES NFR BLD: 9.6 % (ref 4–15)
NEUTROPHILS # BLD AUTO: 3 K/UL (ref 1.8–7.7)
NEUTROPHILS NFR BLD: 55.2 % (ref 38–73)
PLATELET # BLD AUTO: 189 K/UL (ref 150–350)
PMV BLD AUTO: 10.6 FL (ref 9.2–12.9)
RBC # BLD AUTO: 4.75 M/UL (ref 4.6–6.2)
URATE SERPL-MCNC: 4.4 MG/DL (ref 3.4–7)
WBC # BLD AUTO: 5.34 K/UL (ref 3.9–12.7)

## 2019-07-23 PROCEDURE — 63600175 PHARM REV CODE 636 W HCPCS: Performed by: EMERGENCY MEDICINE

## 2019-07-23 PROCEDURE — 84550 ASSAY OF BLOOD/URIC ACID: CPT

## 2019-07-23 PROCEDURE — 85025 COMPLETE CBC W/AUTO DIFF WBC: CPT

## 2019-07-23 PROCEDURE — 99285 EMERGENCY DEPT VISIT HI MDM: CPT | Mod: 25

## 2019-07-23 PROCEDURE — 86140 C-REACTIVE PROTEIN: CPT

## 2019-07-23 PROCEDURE — 96374 THER/PROPH/DIAG INJ IV PUSH: CPT

## 2019-07-23 RX ORDER — IBUPROFEN 600 MG/1
600 TABLET ORAL EVERY 6 HOURS PRN
Qty: 20 TABLET | Refills: 1 | Status: SHIPPED | OUTPATIENT
Start: 2019-07-23 | End: 2024-02-02 | Stop reason: CLARIF

## 2019-07-23 RX ORDER — KETOROLAC TROMETHAMINE 30 MG/ML
15 INJECTION, SOLUTION INTRAMUSCULAR; INTRAVENOUS
Status: COMPLETED | OUTPATIENT
Start: 2019-07-23 | End: 2019-07-23

## 2019-07-23 RX ADMIN — KETOROLAC TROMETHAMINE 15 MG: 30 INJECTION, SOLUTION INTRAMUSCULAR at 02:07

## 2019-07-23 NOTE — ED NOTES
38 year old male presents to ed cc of chronic pain to left shoulder and new onset pain to right foot patient denies fall or trauma to foot. Patient ambulatory with steady gait to ed bed.

## 2019-07-23 NOTE — ED PROVIDER NOTES
Encounter Date: 7/23/2019    SCRIBE #1 NOTE: I, Maria T Cantrell, am scribing for, and in the presence of,  Dr. Tinsley. I have scribed the entire note.       History     Chief Complaint   Patient presents with    Ankle Pain     Pain to [facility] xfew days, denies trauma.      Time seen by provider: 2:09 PM    This is a 38 y.o. male who presents with complaint of right ankle pain. He reports onset of symptoms was about 2 days ago. The worse of the pain is to the back. He describes the pain as sharp. The patient has associated swelling in the ankle and right calf pain but denies any open wounds, numbness, tingling or weakness in the legs. He notes the pain worsens with weight bearing and movement. The patient has been using Ibuprofen for pain with some improvement. He denies any recent trauma or injury. The patient also notes having non-traumatic left shoulder pain for the last few days.       The history is provided by the patient.     Review of patient's allergies indicates:  No Known Allergies  Past Medical History:   Diagnosis Date    Hepatitis C      Past Surgical History:   Procedure Laterality Date    NOSE SURGERY       History reviewed. No pertinent family history.  Social History     Tobacco Use    Smoking status: Current Every Day Smoker     Packs/day: 0.50     Types: Cigarettes    Smokeless tobacco: Never Used   Substance Use Topics    Alcohol use: No    Drug use: Not Currently     Types: IV, Cocaine, Marijuana     Comment: heroin      Review of Systems   Constitutional: Negative for chills and fever.   HENT: Negative for congestion, ear pain, rhinorrhea and sore throat.    Respiratory: Negative for cough, shortness of breath and wheezing.    Cardiovascular: Negative for chest pain and palpitations.   Gastrointestinal: Negative for abdominal pain, diarrhea, nausea and vomiting.   Genitourinary: Negative for dysuria and hematuria.   Musculoskeletal: Negative for back pain, myalgias and neck pain.         +left shoulder pain and right ankle pain   Skin: Negative for rash.   Neurological: Negative for dizziness, weakness, light-headedness and headaches.   Psychiatric/Behavioral: Negative for confusion.       Physical Exam     Initial Vitals [07/23/19 1333]   BP Pulse Resp Temp SpO2   115/71 90 18 98.7 °F (37.1 °C) 96 %      MAP       --         Physical Exam    Nursing note and vitals reviewed.  Constitutional: He appears well-developed and well-nourished. He is not diaphoretic. No distress.   HENT:   Head: Normocephalic and atraumatic.   Mouth/Throat: Oropharynx is clear and moist.   Eyes: Conjunctivae and EOM are normal.   Neck: Normal range of motion. Neck supple.   Cardiovascular: Normal rate, regular rhythm and normal heart sounds. Exam reveals no gallop and no friction rub.    No murmur heard.  Pulmonary/Chest: Breath sounds normal. He has no wheezes. He has no rhonchi. He has no rales.   Abdominal: Soft. There is no tenderness. There is no rebound and no guarding.   Musculoskeletal: Normal range of motion. He exhibits edema and tenderness.   RLE: Decreased ROM of right ankle due to pain. Swelling just above the malleoli extending up the calf with diffuse tenderness. Palpable DP and PT pulses of the foot.    Lymphadenopathy:     He has no cervical adenopathy.   Neurological: He is alert and oriented to person, place, and time. He has normal strength.   Skin: Skin is warm and dry. No rash noted.         ED Course   Procedures  Labs Reviewed   CBC W/ AUTO DIFFERENTIAL - Abnormal; Notable for the following components:       Result Value    Hemoglobin 13.7 (*)     All other components within normal limits   C-REACTIVE PROTEIN - Abnormal; Notable for the following components:    CRP 20.8 (*)     All other components within normal limits   URIC ACID          Imaging Results          US Lower Extremity Veins Right (Final result)  Result time 07/23/19 15:16:37    Final result by Marvin Obrien MD (07/23/19  15:16:37)                 Impression:      No evidence of deep venous thrombosis in the right lower extremity.      Electronically signed by: Marvin Obrien  Date:    07/23/2019  Time:    15:16             Narrative:    EXAMINATION:  US LOWER EXTREMITY VEINS RIGHT    CLINICAL HISTORY:  Pain in right leg    TECHNIQUE:  Duplex and color flow Doppler evaluation and graded compression of the right lower extremity veins was performed.    COMPARISON:  None available    FINDINGS:  Right thigh veins: The common femoral, femoral, popliteal, upper greater saphenous, and deep femoral veins are color Doppler patent and/or compressible.    Right calf veins: The visualized calf veins are patent.    Contralateral CFV: The contralateral (left) common femoral vein is patent and free of thrombus.                               X-Ray Tibia Fibula 2 View Right (Final result)  Result time 07/23/19 14:48:07    Final result by Marvin Obrien MD (07/23/19 14:48:07)                 Impression:      As above.      Electronically signed by: Marvin Obrien  Date:    07/23/2019  Time:    14:48             Narrative:    EXAMINATION:  XR TIBIA FIBULA 2 VIEW RIGHT    CLINICAL HISTORY:  Pain in right ankle and joints of right foot    TECHNIQUE:  AP and lateral views of the right tibia and fibula were performed.    COMPARISON:  None available    FINDINGS:  No acute fracture or dislocation.  No osseous erosion.  Joint articulations are maintained.  Mild heterogeneity of Kager's fat.                                 Medical Decision Making:   Clinical Tests:   Lab Tests: Ordered and Reviewed  Radiological Study: Ordered and Reviewed  ED Management:  38-year-old male with severe pain over his right Achilles tendon extending to the proximal calf.  X-ray and ultrasound are normal. He has no fever and normal white blood cell count.  Uric acid is also normal. C reactive protein is 20.8.  I will treat him as an Achilles tendonitis with ibuprofen.  I  have also placed an ambulatory referral to [facility] for close follow-up, recheck and further treatment as warranted.                      Clinical Impression:     1. Achilles tendinitis of right lower extremity    2. Ankle pain, right    3. Leg pain, right          I, Dr. Constantino Tinsley, personally performed the services described in this documentation. All medical record entries made by the scribe were at my direction and in my presence. I have reviewed the chart and agree that the record reflects my personal performance and is accurate and complete. Constantino Tinsley MD.  5:11 PM 07/23/2019                     Constantino Tinsley MD  07/23/19 7031

## 2019-10-24 ENCOUNTER — HOSPITAL ENCOUNTER (EMERGENCY)
Facility: HOSPITAL | Age: 39
Discharge: HOME | End: 2019-10-25
Attending: EMERGENCY MEDICINE
Payer: MEDICAID

## 2019-10-24 DIAGNOSIS — T14.8XXA SKIN AVULSION: Primary | ICD-10-CM

## 2019-10-24 PROCEDURE — 99283 EMERGENCY DEPT VISIT LOW MDM: CPT

## 2019-10-25 VITALS
TEMPERATURE: 98 F | HEART RATE: 77 BPM | RESPIRATION RATE: 16 BRPM | DIASTOLIC BLOOD PRESSURE: 72 MMHG | WEIGHT: 156 LBS | OXYGEN SATURATION: 99 % | BODY MASS INDEX: 25.07 KG/M2 | HEIGHT: 66 IN | SYSTOLIC BLOOD PRESSURE: 112 MMHG

## 2019-10-25 PROCEDURE — 25000003 PHARM REV CODE 250: Performed by: EMERGENCY MEDICINE

## 2019-10-25 RX ADMIN — NEOMYCIN-BACITRACIN-POLYMYXIN OINT 1 EACH: OINTMENT at 12:10

## 2019-10-25 NOTE — ED PROVIDER NOTES
Encounter Date: 10/24/2019       History     Chief Complaint   Patient presents with    Laceration     38y M ambulatory to ED with laceration to left index finger from steak knife. moderate bleeding noted in triage, pressure dressing applied     38-year-old male presents after injuring his left index finger with a steak knife just prior to arrival.  States the pain is constant, severe.  Affected area is the finger tip.  Pain is worse to palpation.        Review of patient's allergies indicates:  No Known Allergies  Past Medical History:   Diagnosis Date    Hepatitis C      Past Surgical History:   Procedure Laterality Date    NOSE SURGERY       No family history on file.  Social History     Tobacco Use    Smoking status: Current Every Day Smoker     Packs/day: 0.50     Types: Cigarettes    Smokeless tobacco: Never Used   Substance Use Topics    Alcohol use: No    Drug use: Not Currently     Types: IV, Cocaine, Marijuana     Comment: heroin      Review of Systems   Skin: Positive for wound.   Neurological: Negative for weakness.       Physical Exam     Initial Vitals [10/24/19 2257]   BP Pulse Resp Temp SpO2   102/67 90 16 98.6 °F (37 °C) 97 %      MAP       --         Physical Exam    Nursing note and vitals reviewed.  Constitutional: He appears well-developed and well-nourished. No distress.   Cardiovascular: Normal rate, regular rhythm and intact distal pulses.   Pulmonary/Chest: No respiratory distress.   Musculoskeletal: He exhibits tenderness (Localized to distal left index finger tip, wound shown in diagram, no nail bed involvement, clean, no foreign bodies, subcentimeter skin avulsion). He exhibits no edema.        Hands:  Neurological: He is alert and oriented to person, place, and time. He has normal strength.         ED Course   Procedures  Labs Reviewed - No data to display       Imaging Results    None          Medical Decision Making:   Differential Diagnosis:   Laceration, foreign body,  avulsion  ED Management:  Wound cleansed and irrigated with copious saline.  Nonstick dressing applied.  The wound is not amenable to repair as it is a skin avulsion.  Discussed expected course of injury, indications for ED return including increased pain, fever, redness, pus, any symptoms concerning for infection; follow-up to ensure resolution of wound.                      Clinical Impression:       ICD-10-CM ICD-9-CM   1. Skin avulsion T14.8XXA 879.8         Disposition:   Disposition: Discharged  Condition: Stable                        Guy J. Lefort, MD  10/25/19 0016

## 2019-10-25 NOTE — ED TRIAGE NOTES
Patient roomed in ED 2. AAO, reports that he was cutting steak and sliced the tip of L index finger. Occlusive dressing applied in triage, wound not visible at this time. No bleeding noted to dressing. 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 Patient states that he can't take opioids for pain because he takes and opioid blocker medication and pain med wouldn't be effective. Dr. Lefort notified.

## 2019-10-25 NOTE — ED NOTES
APPEARANCE: Alert, oriented.  CARDIAC: Normal sinus rhythm noted on CR monitor, HR and BP WNL. Denies chest pain  PERIPHERAL VASCULAR: peripheral pulses +2 and present x4 extremities. Cap refill <3 seconds.. No edema or discoloration. Warm to touch.  RESPIRATORY:Normal rate and effort, breath sounds clear bilaterally throughout chest. Respirations are equal and unlabored no obvious signs of distress.  GASTRO: soft, bowel sounds normal, no tenderness, no abdominal distention.  G/U: no problems urinating reported  MUSC: Full ROM x4 extremities. No obvious deformity.  SKIN: Skin is warm and dry, normal skin turgor, mucous membranes moist. L index finger laceration. Dressing intact to site.  NEURO:  GCS 15, motor strength WNL x4 extremities.   MENTAL STATUS: AAOx3, calm, cooperative, behavior appropriate to situation  EYE: No overt deficits noted   ENT: trachea midline, no problems identified

## 2019-10-25 NOTE — ED NOTES
Dr. Lefort notified that patient is still bleeding from L index finger while performing wound care. Pressure dressing applied. MD states that he is expecting the bleeding to take longer to resolve due to avulsion type of injury making it not a candidate for wound closure. Awaiting MD to re-eval prior to discharge home.

## 2019-12-17 ENCOUNTER — HOSPITAL ENCOUNTER (EMERGENCY)
Facility: HOSPITAL | Age: 39
Discharge: HOME | End: 2019-12-17
Attending: EMERGENCY MEDICINE
Payer: MEDICAID

## 2019-12-17 VITALS
BODY MASS INDEX: 25.71 KG/M2 | HEART RATE: 106 BPM | TEMPERATURE: 98 F | WEIGHT: 160 LBS | SYSTOLIC BLOOD PRESSURE: 139 MMHG | DIASTOLIC BLOOD PRESSURE: 93 MMHG | OXYGEN SATURATION: 99 % | RESPIRATION RATE: 18 BRPM | HEIGHT: 66 IN

## 2019-12-17 DIAGNOSIS — K08.89 PAIN, DENTAL: Primary | ICD-10-CM

## 2019-12-17 DIAGNOSIS — K08.89 DENTALGIA: ICD-10-CM

## 2019-12-17 PROCEDURE — 99284 PR EMERGENCY DEPT VISIT,LEVEL IV: ICD-10-PCS | Mod: ,,, | Performed by: EMERGENCY MEDICINE

## 2019-12-17 PROCEDURE — 25000003 PHARM REV CODE 250: Performed by: EMERGENCY MEDICINE

## 2019-12-17 PROCEDURE — 99284 EMERGENCY DEPT VISIT MOD MDM: CPT | Mod: ,,, | Performed by: EMERGENCY MEDICINE

## 2019-12-17 PROCEDURE — 99283 EMERGENCY DEPT VISIT LOW MDM: CPT

## 2019-12-17 RX ORDER — HYDROCODONE BITARTRATE AND ACETAMINOPHEN 7.5; 325 MG/1; MG/1
1 TABLET ORAL EVERY 6 HOURS PRN
Qty: 10 TABLET | Refills: 0 | Status: SHIPPED | OUTPATIENT
Start: 2019-12-17 | End: 2024-02-02 | Stop reason: CLARIF

## 2019-12-17 RX ORDER — TRAZODONE HYDROCHLORIDE 100 MG/1
100 TABLET ORAL NIGHTLY
COMMUNITY

## 2019-12-17 RX ORDER — HYDROCODONE BITARTRATE AND ACETAMINOPHEN 5; 325 MG/1; MG/1
1 TABLET ORAL
Status: COMPLETED | OUTPATIENT
Start: 2019-12-17 | End: 2019-12-17

## 2019-12-17 RX ORDER — PENICILLIN V POTASSIUM 500 MG/1
500 TABLET, FILM COATED ORAL 4 TIMES DAILY
Qty: 40 TABLET | Refills: 0 | Status: SHIPPED | OUTPATIENT
Start: 2019-12-17 | End: 2019-12-24

## 2019-12-17 RX ORDER — NAPROXEN 500 MG/1
500 TABLET ORAL 2 TIMES DAILY WITH MEALS
Qty: 20 TABLET | Refills: 0 | Status: SHIPPED | OUTPATIENT
Start: 2019-12-17 | End: 2024-02-02 | Stop reason: CLARIF

## 2019-12-17 RX ADMIN — HYDROCODONE BITARTRATE AND ACETAMINOPHEN 1 TABLET: 5; 325 TABLET ORAL at 06:12

## 2019-12-17 NOTE — ED PROVIDER NOTES
Encounter Date: 12/17/2019    SCRIBE #1 NOTE: I, Zoey Fournier, am scribing for, and in the presence of,  Dr. Mega Mane MD. I have scribed the following portions of the note - Other sections scribed: HPI, ROS, and PE.       History     Chief Complaint   Patient presents with    Dental Pain     The patient was seen by the provider at 5:56 PM.     The patient has a past medical history of hepatitis C with complaints of dental pain. The patient reports he has chronic dental pain and does not have a dentist. He states the pain worsens with cold sensation and to touch. The patient reports the pain worsened over the past two days and is present on the right side of his mouth. He states he has multiple missig teeth where the pain is located. The patient reports he has not seen a dentist due to inability to afford it. He states he is a current smoker and has been decreasing his tobacco use.     The history is provided by the patient and medical records.     Review of patient's allergies indicates:  No Known Allergies  Past Medical History:   Diagnosis Date    Hepatitis C      Past Surgical History:   Procedure Laterality Date    NOSE SURGERY       Family History   Problem Relation Age of Onset    No Known Problems Mother      Social History     Tobacco Use    Smoking status: Current Every Day Smoker     Packs/day: 0.50     Types: Cigarettes    Smokeless tobacco: Never Used   Substance Use Topics    Alcohol use: No    Drug use: Not Currently     Types: IV, Cocaine, Marijuana     Comment: heroin      Review of Systems   Constitutional: Negative for chills and fever.   HENT: Positive for dental problem. Negative for rhinorrhea and sore throat.    Eyes: Negative for visual disturbance.   Respiratory: Negative for cough and shortness of breath.    Cardiovascular: Negative for chest pain.   Gastrointestinal: Negative for nausea and vomiting.   Genitourinary: Negative for dysuria and hematuria.   Musculoskeletal: Negative for  back pain and neck pain.   Skin: Negative for rash.   Neurological: Negative for weakness and numbness.   Hematological: Does not bruise/bleed easily.       Physical Exam     Initial Vitals [12/17/19 1737]   BP Pulse Resp Temp SpO2   (!) 139/93 106 18 97.9 °F (36.6 °C) 99 %      MAP       --         Physical Exam    Nursing note and vitals reviewed.  Constitutional: He appears well-developed and well-nourished. No distress.   HENT:   Head: Normocephalic and atraumatic.   Mouth/Throat: Oropharynx is clear and moist.   + multiple fractured teeth; + lower right first molar tenderness with no fluctuance or indication for I&D   Eyes: EOM are normal. Pupils are equal, round, and reactive to light.   Neck: Normal range of motion. Neck supple.   Cardiovascular: Normal rate, regular rhythm, normal heart sounds and intact distal pulses.   Pulmonary/Chest: Breath sounds normal. No respiratory distress.   Abdominal: Soft. Bowel sounds are normal. He exhibits no distension. There is no tenderness. There is no rebound.   Musculoskeletal: Normal range of motion. He exhibits no edema or tenderness.   Neurological: He is alert and oriented to person, place, and time.   Skin: Skin is warm and dry.   Psychiatric: He has a normal mood and affect.         ED Course   Procedures  Labs Reviewed - No data to display       Imaging Results    None          Medical Decision Making:   History:   Old Medical Records: I decided to obtain old medical records.  ED Management:  The patient has chronic dental pain that has worsened in the last two days. I suspect a periapical abscess.     6:08 PM  The patient will be discharged with penicillin, naprosyn, norco, and resources for dental care.             Scribe Attestation:   Scribe #1: I performed the above scribed service and the documentation accurately describes the services I performed. I attest to the accuracy of the note.                          Clinical Impression:       ICD-10-CM ICD-9-CM    1. Pain, dental K08.89 525.9   2. Dentalgia K08.89 525.9         Disposition:   Disposition: Discharged  Condition: Stable                     Mega Mane MD  12/18/19 9418

## 2019-12-17 NOTE — ED NOTES
"The patient came to the ER today with c/o a right bottom toothache that has "been there" but worse over the last 2 days  "

## 2019-12-18 NOTE — DISCHARGE INSTRUCTIONS
Follow-up with your primary care doctor the next 2-3 days for recheck.  Return to the emergency department for any worsening signs or symptoms.

## 2020-02-17 ENCOUNTER — HOSPITAL ENCOUNTER (EMERGENCY)
Facility: HOSPITAL | Age: 40
Discharge: HOME | End: 2020-02-18
Attending: EMERGENCY MEDICINE
Payer: MEDICAID

## 2020-02-17 VITALS
BODY MASS INDEX: 24.11 KG/M2 | RESPIRATION RATE: 18 BRPM | HEIGHT: 66 IN | HEART RATE: 88 BPM | WEIGHT: 150 LBS | TEMPERATURE: 99 F | OXYGEN SATURATION: 99 % | DIASTOLIC BLOOD PRESSURE: 85 MMHG | SYSTOLIC BLOOD PRESSURE: 134 MMHG

## 2020-02-17 DIAGNOSIS — Z20.2 POSSIBLE EXPOSURE TO STD: Primary | ICD-10-CM

## 2020-02-17 PROCEDURE — 99281 EMR DPT VST MAYX REQ PHY/QHP: CPT

## 2020-02-18 NOTE — ED PROVIDER NOTES
Encounter Date: 2/17/2020    SCRIBE #1 NOTE: I, Justo Tolbert, am scribing for, and in the presence of,  RUSTY Alejo. I have scribed the entire note.       History     Chief Complaint   Patient presents with    Exposure to STD     Pt was told he was positive for HIV and wants a second opinion     José Luis Rolon is a 39 y.o. male who  has a past medical history of Hepatitis C.    The patient presents to the ED due to STD check. Patient reports that he was told that he was HIV positive at he end of January 2020 at [place] [facility], and was told to follow up with infectious disease. However, he has come to the ER expecting results today. Patient is currently asymptomatic, and has no medical complaints.  Denies any high risk behaviour that would make him susceptible to an HIV infection.    The history is provided by the patient.     Review of patient's allergies indicates:  No Known Allergies  Past Medical History:   Diagnosis Date    Hepatitis C      Past Surgical History:   Procedure Laterality Date    NOSE SURGERY       Family History   Problem Relation Age of Onset    No Known Problems Mother      Social History     Tobacco Use    Smoking status: Current Every Day Smoker     Packs/day: 0.50     Types: Cigarettes    Smokeless tobacco: Never Used   Substance Use Topics    Alcohol use: No    Drug use: Not Currently     Types: IV, Cocaine, Marijuana     Comment: heroin      Review of Systems   Constitutional: Negative for fever.   Genitourinary: Negative for discharge, dysuria and genital sores.   Skin: Negative for rash.       Physical Exam     Initial Vitals [02/17/20 1916]   BP Pulse Resp Temp SpO2   134/85 88 18 98.6 °F (37 °C) 99 %      MAP       --         Physical Exam    Nursing note and vitals reviewed.  Constitutional: He appears well-developed and well-nourished.   HENT:   Head: Normocephalic and atraumatic.   Right Ear: External ear normal.   Left Ear: External ear normal.   Eyes: EOM are normal.   Neck:  Normal range of motion.   Cardiovascular: Normal rate and regular rhythm.   Pulmonary/Chest: No respiratory distress.   Musculoskeletal: Normal range of motion.   Neurological: He is alert and oriented to person, place, and time.   Skin: Skin is warm and dry. Capillary refill takes less than 2 seconds. No rash noted.         ED Course   Procedures  Labs Reviewed - No data to display       Imaging Results    None          Medical Decision Making:   Initial Assessment:   Request for HIV testing  ED Management:  Patient presents to the ER requesting HIV testing.  Has no complaints at this time.  Patient was told that rapid HIV testing is not provided in the emergency room.  He was given resources to follow up at [facility].                                Clinical Impression:       ICD-10-CM ICD-9-CM   1. Possible exposure to STD Z20.2 V01.6     Disposition:   Disposition: Discharged  Condition: Stable      I, Sofia Smith PA-C, personally performed the services described in this documentation. All medical record entries made by the scribe were at my direction and in my presence.  I have reviewed the chart and agree that the record reflects my personal performance and is accurate and complete.           Sofia Smith PA-C  02/17/20 6219

## 2020-02-18 NOTE — ED NOTES
APPEARANCE: Alert, oriented and in no acute distress.  CARDIAC: Normal rate    PERIPHERAL VASCULAR: peripheral pulses present. Normal cap refill. No edema. Warm to touch.    RESPIRATORY:Normal rate and effort. Respirations are equal and unlabored no obvious signs of distress.  GASTRO: soft, no tenderness, no abdominal distention.  MUSC: Full ROM. No bony tenderness or soft tissue tenderness. No obvious deformity.  SKIN: Skin is warm and dry, normal skin turgor, mucous membranes moist.

## 2020-02-18 NOTE — ED NOTES
PAtient discharged by RUSTY Black. Patient given resources to follow up in regards to recent HIV diagnosis.

## 2020-05-15 NOTE — DISCHARGE INSTRUCTIONS
Overdose, Opiate (English) View Edit Remove       Prednisone Counseling:  I discussed with the patient the risks of prolonged use of prednisone including but not limited to weight gain, insomnia, osteoporosis, mood changes, diabetes, susceptibility to infection, glaucoma and high blood pressure.  In cases where prednisone use is prolonged, patients should be monitored with blood pressure checks, serum glucose levels and an eye exam.  Additionally, the patient may need to be placed on GI prophylaxis, PCP prophylaxis, and calcium and vitamin D supplementation and/or a bisphosphonate.  The patient verbalized understanding of the proper use and the possible adverse effects of prednisone.  All of the patient's questions and concerns were addressed.

## 2020-05-21 ENCOUNTER — HOSPITAL ENCOUNTER (EMERGENCY)
Facility: HOSPITAL | Age: 40
Discharge: HOME | End: 2020-05-21
Attending: EMERGENCY MEDICINE
Payer: MEDICAID

## 2020-05-21 VITALS
TEMPERATURE: 99 F | WEIGHT: 155 LBS | HEIGHT: 66 IN | DIASTOLIC BLOOD PRESSURE: 78 MMHG | BODY MASS INDEX: 24.91 KG/M2 | RESPIRATION RATE: 18 BRPM | HEART RATE: 84 BPM | OXYGEN SATURATION: 95 % | SYSTOLIC BLOOD PRESSURE: 144 MMHG

## 2020-05-21 DIAGNOSIS — M25.473 ANKLE SWELLING: ICD-10-CM

## 2020-05-21 DIAGNOSIS — L03.119 CELLULITIS OF FOOT: Primary | ICD-10-CM

## 2020-05-21 PROCEDURE — 25000003 PHARM REV CODE 250: Performed by: EMERGENCY MEDICINE

## 2020-05-21 PROCEDURE — 99284 EMERGENCY DEPT VISIT MOD MDM: CPT | Mod: 25

## 2020-05-21 RX ORDER — IBUPROFEN 800 MG/1
800 TABLET ORAL EVERY 6 HOURS PRN
Qty: 20 TABLET | Refills: 0 | Status: SHIPPED | OUTPATIENT
Start: 2020-05-21 | End: 2024-02-02 | Stop reason: CLARIF

## 2020-05-21 RX ORDER — CEPHALEXIN 500 MG/1
500 CAPSULE ORAL
Status: COMPLETED | OUTPATIENT
Start: 2020-05-21 | End: 2020-05-21

## 2020-05-21 RX ORDER — CEPHALEXIN 500 MG/1
500 CAPSULE ORAL 4 TIMES DAILY
Qty: 20 CAPSULE | Refills: 0 | Status: SHIPPED | OUTPATIENT
Start: 2020-05-21 | End: 2020-05-26

## 2020-05-21 RX ORDER — IBUPROFEN 400 MG/1
800 TABLET ORAL
Status: COMPLETED | OUTPATIENT
Start: 2020-05-21 | End: 2020-05-21

## 2020-05-21 RX ADMIN — IBUPROFEN 800 MG: 400 TABLET, FILM COATED ORAL at 08:05

## 2020-05-21 RX ADMIN — CEPHALEXIN 500 MG: 500 CAPSULE ORAL at 08:05

## 2020-05-21 NOTE — PROVIDER PROGRESS NOTES - EMERGENCY DEPT.
Emergency Department TeleTRIAGE Encounter Note      CHIEF COMPLAINT    Chief Complaint   Patient presents with    Ankle Pain     pt c/o right ankle pain since yesterday, reports swelling and warmth that started this morning; no hx of trauma reported; moderate swelling with warmth and redness noted; +pulses        VITAL SIGNS   Initial Vitals [05/21/20 1841]   BP Pulse Resp Temp SpO2   124/76 101 20 (!) 100.5 °F (38.1 °C) 95 %      MAP       --            ALLERGIES    Review of patient's allergies indicates:  No Known Allergies    PROVIDER TRIAGE NOTE  Patient with past medical history hepatitis-C presents to the ED for evaluation of right ankle pain.  Patient reports right ankle pain that started yesterday morning.  He reports swelling and warmth that worsened when he woke up this morning.  He denies history of trauma, injury, or skin lesions.  He is able to range the ankle however does exacerbate the pain.  He did not know he was running fever until he got to the ED. No other complaints      ORDERS  Labs Reviewed - No data to display    ED Orders (720h ago, onward)    Start Ordered     Status Ordering Provider    05/21/20 1850 05/21/20 1849  X-Ray Ankle Complete Right  1 time imaging      Ordered CAITLYN ALVAREZ            Virtual Visit Note: The provider triage portion of this emergency department evaluation and documentation was performed via [employer], a HIPAA-compliant telemedicine application, in concert with a tele-presenter in the room. A face to face patient evaluation with one of my colleagues will occur once the patient is placed in an emergency department room.      DISCLAIMER: This note was prepared with [employer] voice recognition transcription software. Garbled syntax, mangled pronouns, and other bizarre constructions may be attributed to that software system.

## 2020-05-22 NOTE — ED NOTES
Patient identifiers for José Luis Rolon checked and correct.  LOC: The patient is awake, alert and aware of environment with an appropriate affect, the patient is oriented x 3 and speaking appropriately.  APPEARANCE: Patient resting comfortably and in no acute distress, patient is clean and well groomed, patient's clothing are properly fastened.  SKIN: The skin is warm and dry, patient has normal skin turgor and moist mucus membranes, skin intact.  Swelling to right foot and ankle. + pedal pulse.  Pt using crutches.  MUSKULOSKELETAL: Swelling and redness to right foot and cait.

## 2020-05-22 NOTE — DISCHARGE INSTRUCTIONS
Please take Ibuprofen , Tylenol, and antibiotics as prescribed.  If you start having worsening pain, fever, worsening spreading of infection or any concerning reason return to the ER for re-evaluation.  You may also ice her leg elevated to help with the pain.

## 2020-05-22 NOTE — ED PROVIDER NOTES
"Encounter Date: 5/21/2020    SCRIBE #1 NOTE: I, Nai Thornton, am scribing for, and in the presence of,  Dr. Galvan. I have scribed the entire note.       History     Chief Complaint   Patient presents with    Ankle Pain     pt c/o right ankle pain since yesterday, reports swelling and warmth that started this morning; no hx of trauma reported; moderate swelling with warmth and redness noted; +pulses      José Luis Rolon is a 39 y.o. male who  has a past medical history of Hepatitis C.    The patient presents to the ED due to right ankle pain. Patient notes onset began around 2am this morning. He notes some swelling and redness to the area. Pain is worse with ambulation. He notes the ankle feels "stiff." Patient has low grade fever on exam of 100.5. He denies any trauma or recent surgery to the area. He denies any alcohol use. Patient endorses eating a lot of meat.     The history is provided by the patient.     Review of patient's allergies indicates:  No Known Allergies  Past Medical History:   Diagnosis Date    Hepatitis C      Past Surgical History:   Procedure Laterality Date    NOSE SURGERY       Family History   Problem Relation Age of Onset    No Known Problems Mother      Social History     Tobacco Use    Smoking status: Current Every Day Smoker     Packs/day: 0.50     Types: Cigarettes    Smokeless tobacco: Never Used   Substance Use Topics    Alcohol use: No    Drug use: Not Currently     Types: IV, Cocaine, Marijuana     Comment: heroin      Review of Systems   Musculoskeletal: Positive for joint swelling (right ankle).   All other systems reviewed and are negative.      Physical Exam     Initial Vitals [05/21/20 1841]   BP Pulse Resp Temp SpO2   124/76 101 20 (!) 100.5 °F (38.1 °C) 95 %      MAP       --         Physical Exam    Nursing note and vitals reviewed.  Constitutional: He appears well-developed and well-nourished. He is not diaphoretic. No distress.   HENT:   Head: Normocephalic and " "atraumatic.   Mouth/Throat: Oropharynx is clear and moist.   Eyes: Conjunctivae and EOM are normal.   Neck: Normal range of motion. Neck supple.   Cardiovascular: Normal rate, regular rhythm and normal heart sounds.   Pulmonary/Chest: Breath sounds normal. No respiratory distress.   Abdominal: Soft. There is no tenderness.   Musculoskeletal: Normal range of motion. He exhibits no edema or tenderness.   RLE erythema and celulitic changes to right foot tracing to shin.   No purulent discharge   No sign of trauma  No pain with movement   Worse pain with external rotation due to patient being "stiff"    Neurological: He is alert and oriented to person, place, and time. He has normal strength.   Skin: Skin is warm and dry.   Psychiatric: He has a normal mood and affect. His behavior is normal. Thought content normal.         ED Course   Procedures  Labs Reviewed - No data to display       Imaging Results          X-Ray Ankle Complete Right (Final result)  Result time 05/21/20 19:18:55    Final result by Francisco Javier Encarnacion MD (05/21/20 19:18:55)                 Impression:      No acute osseous abnormality identified.      Electronically signed by: Francisco Javier Encarnacion MD  Date:    05/21/2020  Time:    19:18             Narrative:    EXAMINATION:  XR ANKLE COMPLETE 3 VIEW RIGHT    CLINICAL HISTORY:  Effusion, unspecified ankle    TECHNIQUE:  AP, lateral, and oblique images of the right ankle were performed.    COMPARISON:  None    FINDINGS:  No evidence of acute displaced fracture, dislocation, or osseous destructive process.  Ankle mortise is maintained.  Soft tissue swelling is seen over the medial malleolus.                                 Medical Decision Making:   Initial Assessment:   This is a 39-year-old male, denies any medical history, any current medications, presents the ER for evaluation right ankle/foot pain.  Onset yesterday, progressively worsening.  No known trauma, injury, lacerations.  No history of surgery in " "the foot or the ankle.  Reports worsening pain with ambulation and erythema.  Came to the ER for further evaluation.  He has cellulitic changes to midfoot of his right foot tracking to the shin past the ankle.  He does have appropriate range of motion, but some pain with internal rotation, but he reports his ankle is "stiff" from sitting down.  He has a low grade fever of 100.5, however patient denies any fever, chills, chest pain or shortness of breath.  Physical exam is consistent with cellulitis, other differential includes abscess, septic joint, necrotizing fasciitis versus other cause.  Will obtain x-ray to evaluate free air will reassess.  Likely plan discharge.                Attending Attestation:           Physician Attestation for Scribe:  Physician Attestation Statement for Scribe #1: I, Dr. Galvan, reviewed documentation, as scribed by Nai Thornton in my presence, and it is both accurate and complete.                 ED Course as of May 21 2127   Thu May 21, 2020   2043 This is a 39-year-old male, denies any medical history, any current medications, presents the ER for evaluation right ankle/foot pain.  Onset yesterday, progressively worsening.  No known trauma, injury, lacerations.  No history of surgery in the foot or the ankle.  Reports worsening pain with ambulation and erythema.  Came to the ER for further evaluation.  He has cellulitic changes to midfoot of his right foot tracking to the shin past the ankle.  He does have appropriate range of motion, but some pain with internal rotation, but he reports his ankle is "stiff" from sitting down.  He has a low grade fever of 100.5, however patient denies any fever, chills, chest pain or shortness of breath.  Physical exam is consistent with cellulitis, other differential includes abscess, septic joint, necrotizing fasciitis versus other cause.  Will obtain x-ray to evaluate free air will reassess.  Likely plan discharge.    [SE]   2045 Resting " comfortably in bed, no acute distress, x-ray negative for free air.  Discussed patient diagnosis of cellulitis.  I discussed with patient at length with risk benefits of admission for IV antibiotics versus discharge.  Patient agreed to be discharged and we will give Keflex and Motrin, also advised to take Tylenol.  Patient has worsening fever, chills, redness, pain to the area or any concerning reason he will return back to the ER for IV antibiotics.  Do not believe it is septic joint at this time, no real risk factors.  Patient understood agreed with plan.  Patient will be discharged.    [SE]      ED Course User Index  [SE] Zoya Galvan MD                Clinical Impression:       ICD-10-CM ICD-9-CM   1. Cellulitis of foot L03.119 682.7   2. Ankle swelling M25.473 719.07         Disposition:   Disposition: Discharged  Condition: Stable     ED Disposition Condition    Discharge Stable        ED Prescriptions     Medication Sig Dispense Start Date End Date Auth. Provider    cephALEXin (KEFLEX) 500 MG capsule Take 1 capsule (500 mg total) by mouth 4 (four) times daily. for 5 days 20 capsule 5/21/2020 5/26/2020 Zoya Galvan MD    ibuprofen (ADVIL,MOTRIN) 800 MG tablet Take 1 tablet (800 mg total) by mouth every 6 (six) hours as needed. 20 tablet 5/21/2020  Zoya Galvan MD        Follow-up Information     Follow up With Specialties Details Why Contact Info    [facility] In 1 day Return to this department 24-48 hours worsening fever, chills, or pain [address] [facility] [id number] [phone]                                     Zoya Galvan MD  05/22/20 0257

## 2021-06-15 ENCOUNTER — HOSPITAL ENCOUNTER (EMERGENCY)
Facility: HOSPITAL | Age: 41
Discharge: HOME | End: 2021-06-16
Attending: EMERGENCY MEDICINE
Payer: MEDICAID

## 2021-06-15 DIAGNOSIS — R11.0 NAUSEA: ICD-10-CM

## 2021-06-15 DIAGNOSIS — F11.93 HEROIN WITHDRAWAL: Primary | ICD-10-CM

## 2021-06-15 DIAGNOSIS — R11.10 VOMITING AND DIARRHEA: ICD-10-CM

## 2021-06-15 DIAGNOSIS — R19.7 VOMITING AND DIARRHEA: ICD-10-CM

## 2021-06-15 PROCEDURE — 96374 THER/PROPH/DIAG INJ IV PUSH: CPT

## 2021-06-15 PROCEDURE — 99284 EMERGENCY DEPT VISIT MOD MDM: CPT | Mod: ,,, | Performed by: EMERGENCY MEDICINE

## 2021-06-15 PROCEDURE — 80047 BASIC METABLC PNL IONIZED CA: CPT

## 2021-06-15 PROCEDURE — 96361 HYDRATE IV INFUSION ADD-ON: CPT

## 2021-06-15 PROCEDURE — 99284 PR EMERGENCY DEPT VISIT,LEVEL IV: ICD-10-PCS | Mod: ,,, | Performed by: EMERGENCY MEDICINE

## 2021-06-15 PROCEDURE — 99285 EMERGENCY DEPT VISIT HI MDM: CPT | Mod: 25

## 2021-06-16 VITALS
WEIGHT: 150 LBS | OXYGEN SATURATION: 94 % | HEIGHT: 66 IN | SYSTOLIC BLOOD PRESSURE: 116 MMHG | BODY MASS INDEX: 24.11 KG/M2 | HEART RATE: 71 BPM | TEMPERATURE: 98 F | DIASTOLIC BLOOD PRESSURE: 77 MMHG | RESPIRATION RATE: 20 BRPM

## 2021-06-16 LAB
BASOPHILS # BLD AUTO: 0.04 K/UL (ref 0–0.2)
BASOPHILS NFR BLD: 0.6 % (ref 0–1.9)
BUN SERPL-MCNC: 22 MG/DL (ref 6–30)
CHLORIDE SERPL-SCNC: 103 MMOL/L (ref 95–110)
CREAT SERPL-MCNC: 1.1 MG/DL (ref 0.5–1.4)
DIFFERENTIAL METHOD: NORMAL
EOSINOPHIL # BLD AUTO: 0.1 K/UL (ref 0–0.5)
EOSINOPHIL NFR BLD: 1 % (ref 0–8)
ERYTHROCYTE [DISTWIDTH] IN BLOOD BY AUTOMATED COUNT: 12.8 % (ref 11.5–14.5)
GLUCOSE SERPL-MCNC: 97 MG/DL (ref 70–110)
HCT VFR BLD AUTO: 45.8 % (ref 40–54)
HCT VFR BLD CALC: 46 %PCV (ref 36–54)
HGB BLD-MCNC: 15.2 G/DL (ref 14–18)
IMM GRANULOCYTES # BLD AUTO: 0.02 K/UL (ref 0–0.04)
IMM GRANULOCYTES NFR BLD AUTO: 0.3 % (ref 0–0.5)
LYMPHOCYTES # BLD AUTO: 1.7 K/UL (ref 1–4.8)
LYMPHOCYTES NFR BLD: 24.8 % (ref 18–48)
MCH RBC QN AUTO: 28.7 PG (ref 27–31)
MCHC RBC AUTO-ENTMCNC: 33.2 G/DL (ref 32–36)
MCV RBC AUTO: 87 FL (ref 82–98)
MONOCYTES # BLD AUTO: 0.7 K/UL (ref 0.3–1)
MONOCYTES NFR BLD: 9.5 % (ref 4–15)
NEUTROPHILS # BLD AUTO: 4.5 K/UL (ref 1.8–7.7)
NEUTROPHILS NFR BLD: 63.8 % (ref 38–73)
NRBC BLD-RTO: 0 /100 WBC
PLATELET # BLD AUTO: 278 K/UL (ref 150–450)
PMV BLD AUTO: 10.8 FL (ref 9.2–12.9)
POC IONIZED CALCIUM: 1.22 MMOL/L (ref 1.06–1.42)
POC TCO2 (MEASURED): 26 MMOL/L (ref 23–29)
POTASSIUM BLD-SCNC: 5.1 MMOL/L (ref 3.5–5.1)
RBC # BLD AUTO: 5.29 M/UL (ref 4.6–6.2)
SAMPLE: NORMAL
SODIUM BLD-SCNC: 139 MMOL/L (ref 136–145)
TROPONIN I SERPL DL<=0.01 NG/ML-MCNC: <0.006 NG/ML (ref 0–0.03)
WBC # BLD AUTO: 6.98 K/UL (ref 3.9–12.7)

## 2021-06-16 PROCEDURE — 84484 ASSAY OF TROPONIN QUANT: CPT | Performed by: STUDENT IN AN ORGANIZED HEALTH CARE EDUCATION/TRAINING PROGRAM

## 2021-06-16 PROCEDURE — 93005 ELECTROCARDIOGRAM TRACING: CPT

## 2021-06-16 PROCEDURE — 85025 COMPLETE CBC W/AUTO DIFF WBC: CPT | Performed by: STUDENT IN AN ORGANIZED HEALTH CARE EDUCATION/TRAINING PROGRAM

## 2021-06-16 PROCEDURE — 25000003 PHARM REV CODE 250: Performed by: STUDENT IN AN ORGANIZED HEALTH CARE EDUCATION/TRAINING PROGRAM

## 2021-06-16 PROCEDURE — 63600175 PHARM REV CODE 636 W HCPCS: Performed by: EMERGENCY MEDICINE

## 2021-06-16 PROCEDURE — 93010 ELECTROCARDIOGRAM REPORT: CPT | Mod: ,,, | Performed by: INTERNAL MEDICINE

## 2021-06-16 PROCEDURE — 93010 EKG 12-LEAD: ICD-10-PCS | Mod: ,,, | Performed by: INTERNAL MEDICINE

## 2021-06-16 RX ORDER — ONDANSETRON 4 MG/1
4 TABLET, FILM COATED ORAL EVERY 6 HOURS
Qty: 12 TABLET | Refills: 0 | Status: SHIPPED | OUTPATIENT
Start: 2021-06-16 | End: 2021-06-19

## 2021-06-16 RX ORDER — ONDANSETRON 2 MG/ML
4 INJECTION INTRAMUSCULAR; INTRAVENOUS
Status: COMPLETED | OUTPATIENT
Start: 2021-06-16 | End: 2021-06-16

## 2021-06-16 RX ADMIN — SODIUM CHLORIDE 500 ML: 0.9 INJECTION, SOLUTION INTRAVENOUS at 12:06

## 2021-06-16 RX ADMIN — ONDANSETRON 4 MG: 2 INJECTION INTRAMUSCULAR; INTRAVENOUS at 02:06

## 2024-02-02 ENCOUNTER — HOSPITAL ENCOUNTER (INPATIENT)
Facility: HOSPITAL | Age: 44
LOS: 2 days | Discharge: LEFT BEFORE DISCHARGE | DRG: 158 | End: 2024-02-04
Attending: STUDENT IN AN ORGANIZED HEALTH CARE EDUCATION/TRAINING PROGRAM | Admitting: EMERGENCY MEDICINE
Payer: MEDICAID

## 2024-02-02 DIAGNOSIS — E87.1 HYPONATREMIA: ICD-10-CM

## 2024-02-02 DIAGNOSIS — Z86.19 HISTORY OF HEPATITIS C: ICD-10-CM

## 2024-02-02 DIAGNOSIS — R00.0 TACHYCARDIA: ICD-10-CM

## 2024-02-02 DIAGNOSIS — R22.0 LEFT FACIAL SWELLING: ICD-10-CM

## 2024-02-02 DIAGNOSIS — R79.89 ELEVATED LACTIC ACID LEVEL: ICD-10-CM

## 2024-02-02 DIAGNOSIS — L03.211 FACIAL CELLULITIS: ICD-10-CM

## 2024-02-02 DIAGNOSIS — K04.7 DENTAL ABSCESS: Primary | ICD-10-CM

## 2024-02-02 DIAGNOSIS — R07.9 CHEST PAIN: ICD-10-CM

## 2024-02-02 DIAGNOSIS — B20 HIV INFECTION, UNSPECIFIED SYMPTOM STATUS: ICD-10-CM

## 2024-02-02 LAB
ALBUMIN SERPL BCP-MCNC: 3.5 G/DL (ref 3.5–5.2)
ALLENS TEST: NORMAL
ALP SERPL-CCNC: 150 U/L (ref 55–135)
ALT SERPL W/O P-5'-P-CCNC: 67 U/L (ref 10–44)
ANION GAP SERPL CALC-SCNC: 9 MMOL/L (ref 8–16)
AST SERPL-CCNC: 86 U/L (ref 10–40)
BASOPHILS # BLD AUTO: 0.07 K/UL (ref 0–0.2)
BASOPHILS NFR BLD: 1.1 % (ref 0–1.9)
BILIRUB SERPL-MCNC: 0.4 MG/DL (ref 0.1–1)
BUN SERPL-MCNC: 11 MG/DL (ref 6–20)
CALCIUM SERPL-MCNC: 9.9 MG/DL (ref 8.7–10.5)
CHLORIDE SERPL-SCNC: 100 MMOL/L (ref 95–110)
CO2 SERPL-SCNC: 23 MMOL/L (ref 23–29)
CREAT SERPL-MCNC: 0.9 MG/DL (ref 0.5–1.4)
DELSYS: NORMAL
DIFFERENTIAL METHOD BLD: ABNORMAL
EOSINOPHIL # BLD AUTO: 0.1 K/UL (ref 0–0.5)
EOSINOPHIL NFR BLD: 1.2 % (ref 0–8)
ERYTHROCYTE [DISTWIDTH] IN BLOOD BY AUTOMATED COUNT: 14.9 % (ref 11.5–14.5)
EST. GFR  (NO RACE VARIABLE): >60 ML/MIN/1.73 M^2
FIO2: 21
GLUCOSE SERPL-MCNC: 82 MG/DL (ref 70–110)
HCT VFR BLD AUTO: 41.3 % (ref 40–54)
HGB BLD-MCNC: 13 G/DL (ref 14–18)
IMM GRANULOCYTES # BLD AUTO: 0.02 K/UL (ref 0–0.04)
IMM GRANULOCYTES NFR BLD AUTO: 0.3 % (ref 0–0.5)
LACTATE SERPL-SCNC: 2.3 MMOL/L (ref 0.5–2.2)
LDH SERPL L TO P-CCNC: 0.98 MMOL/L (ref 0.5–2.2)
LYMPHOCYTES # BLD AUTO: 1.9 K/UL (ref 1–4.8)
LYMPHOCYTES NFR BLD: 29.1 % (ref 18–48)
MCH RBC QN AUTO: 27.1 PG (ref 27–31)
MCHC RBC AUTO-ENTMCNC: 31.5 G/DL (ref 32–36)
MCV RBC AUTO: 86 FL (ref 82–98)
MODE: NORMAL
MONOCYTES # BLD AUTO: 0.6 K/UL (ref 0.3–1)
MONOCYTES NFR BLD: 9.3 % (ref 4–15)
NEUTROPHILS # BLD AUTO: 3.8 K/UL (ref 1.8–7.7)
NEUTROPHILS NFR BLD: 59 % (ref 38–73)
NRBC BLD-RTO: 0 /100 WBC
PLATELET # BLD AUTO: 214 K/UL (ref 150–450)
PMV BLD AUTO: 10.7 FL (ref 9.2–12.9)
POTASSIUM SERPL-SCNC: 4.5 MMOL/L (ref 3.5–5.1)
PROT SERPL-MCNC: 10.4 G/DL (ref 6–8.4)
RBC # BLD AUTO: 4.79 M/UL (ref 4.6–6.2)
SAMPLE: NORMAL
SITE: NORMAL
SODIUM SERPL-SCNC: 132 MMOL/L (ref 136–145)
WBC # BLD AUTO: 6.43 K/UL (ref 3.9–12.7)

## 2024-02-02 PROCEDURE — 85025 COMPLETE CBC W/AUTO DIFF WBC: CPT | Performed by: PHYSICIAN ASSISTANT

## 2024-02-02 PROCEDURE — 96365 THER/PROPH/DIAG IV INF INIT: CPT

## 2024-02-02 PROCEDURE — 25000003 PHARM REV CODE 250: Performed by: PHYSICIAN ASSISTANT

## 2024-02-02 PROCEDURE — 63600175 PHARM REV CODE 636 W HCPCS: Mod: JZ,JG | Performed by: PHYSICIAN ASSISTANT

## 2024-02-02 PROCEDURE — 80053 COMPREHEN METABOLIC PANEL: CPT | Performed by: PHYSICIAN ASSISTANT

## 2024-02-02 PROCEDURE — 99285 EMERGENCY DEPT VISIT HI MDM: CPT

## 2024-02-02 PROCEDURE — 83605 ASSAY OF LACTIC ACID: CPT

## 2024-02-02 PROCEDURE — 87040 BLOOD CULTURE FOR BACTERIA: CPT | Performed by: PHYSICIAN ASSISTANT

## 2024-02-02 PROCEDURE — 99900035 HC TECH TIME PER 15 MIN (STAT)

## 2024-02-02 PROCEDURE — 96361 HYDRATE IV INFUSION ADD-ON: CPT

## 2024-02-02 PROCEDURE — 12000002 HC ACUTE/MED SURGE SEMI-PRIVATE ROOM

## 2024-02-02 PROCEDURE — 83605 ASSAY OF LACTIC ACID: CPT | Performed by: PHYSICIAN ASSISTANT

## 2024-02-02 RX ORDER — CLINDAMYCIN PHOSPHATE 600 MG/50ML
600 INJECTION, SOLUTION INTRAVENOUS
Status: COMPLETED | OUTPATIENT
Start: 2024-02-02 | End: 2024-02-03

## 2024-02-02 RX ORDER — DARUNAVIR, COBICISTAT, EMTRICITABINE, AND TENOFOVIR ALAFENAMIDE 800; 150; 200; 10 MG/1; MG/1; MG/1; MG/1
TABLET, FILM COATED ORAL
COMMUNITY

## 2024-02-02 RX ADMIN — SODIUM CHLORIDE 1000 ML: 9 INJECTION, SOLUTION INTRAVENOUS at 09:02

## 2024-02-02 RX ADMIN — CLINDAMYCIN IN 5 PERCENT DEXTROSE 600 MG: 12 INJECTION, SOLUTION INTRAVENOUS at 11:02
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
## 2024-02-03 PROBLEM — F11.90 OPIOID USE DISORDER: Chronic | Status: ACTIVE | Noted: 2023-01-30

## 2024-02-03 PROBLEM — B20 HUMAN IMMUNODEFICIENCY VIRUS (HIV) DISEASE: Chronic | Status: ACTIVE | Noted: 2023-01-30

## 2024-02-03 PROBLEM — F11.90 OPIOID USE DISORDER: Status: ACTIVE | Noted: 2023-01-30

## 2024-02-03 PROBLEM — I51.3 RIGHT ATRIAL THROMBUS: Status: ACTIVE | Noted: 2023-04-30

## 2024-02-03 PROBLEM — Z96.641 STATUS POST TOTAL REPLACEMENT OF RIGHT HIP: Chronic | Status: ACTIVE | Noted: 2024-02-03

## 2024-02-03 PROBLEM — B18.2 CHRONIC HEPATITIS C WITHOUT HEPATIC COMA: Chronic | Status: ACTIVE | Noted: 2018-06-19

## 2024-02-03 PROBLEM — F14.90 COCAINE USE: Status: ACTIVE | Noted: 2023-01-30

## 2024-02-03 PROBLEM — F17.200 TOBACCO USE DISORDER: Status: ACTIVE | Noted: 2023-09-05

## 2024-02-03 PROBLEM — R74.01 TRANSAMINITIS: Status: ACTIVE | Noted: 2024-02-03

## 2024-02-03 PROBLEM — E88.09 HYPERPROTEINEMIA: Status: ACTIVE | Noted: 2024-02-03

## 2024-02-03 PROBLEM — N18.9 CKD (CHRONIC KIDNEY DISEASE): Status: ACTIVE | Noted: 2023-11-14

## 2024-02-03 PROBLEM — Z96.641 STATUS POST TOTAL REPLACEMENT OF RIGHT HIP: Status: ACTIVE | Noted: 2024-02-03

## 2024-02-03 PROBLEM — L03.211 FACIAL CELLULITIS: Status: ACTIVE | Noted: 2024-02-03

## 2024-02-03 PROBLEM — K04.7 DENTAL ABSCESS: Status: ACTIVE | Noted: 2024-02-03

## 2024-02-03 PROBLEM — B20 HUMAN IMMUNODEFICIENCY VIRUS (HIV) DISEASE: Status: ACTIVE | Noted: 2023-01-30

## 2024-02-03 PROBLEM — E87.1 HYPONATREMIA: Status: ACTIVE | Noted: 2024-02-03

## 2024-02-03 PROBLEM — B18.2 CHRONIC HEPATITIS C WITHOUT HEPATIC COMA: Status: ACTIVE | Noted: 2018-06-19

## 2024-02-03 LAB
ALBUMIN SERPL BCP-MCNC: 3 G/DL (ref 3.5–5.2)
ALP SERPL-CCNC: 126 U/L (ref 55–135)
ALT SERPL W/O P-5'-P-CCNC: 59 U/L (ref 10–44)
ANION GAP SERPL CALC-SCNC: 6 MMOL/L (ref 8–16)
AST SERPL-CCNC: 74 U/L (ref 10–40)
BASOPHILS # BLD AUTO: 0.04 K/UL (ref 0–0.2)
BASOPHILS NFR BLD: 0.7 % (ref 0–1.9)
BILIRUB SERPL-MCNC: 0.4 MG/DL (ref 0.1–1)
BUN SERPL-MCNC: 10 MG/DL (ref 6–20)
CALCIUM SERPL-MCNC: 9.3 MG/DL (ref 8.7–10.5)
CHLORIDE SERPL-SCNC: 102 MMOL/L (ref 95–110)
CO2 SERPL-SCNC: 26 MMOL/L (ref 23–29)
CREAT SERPL-MCNC: 0.8 MG/DL (ref 0.5–1.4)
DIFFERENTIAL METHOD BLD: ABNORMAL
EOSINOPHIL # BLD AUTO: 0.2 K/UL (ref 0–0.5)
EOSINOPHIL NFR BLD: 3.5 % (ref 0–8)
ERYTHROCYTE [DISTWIDTH] IN BLOOD BY AUTOMATED COUNT: 14.9 % (ref 11.5–14.5)
EST. GFR  (NO RACE VARIABLE): >60 ML/MIN/1.73 M^2
GLUCOSE SERPL-MCNC: 96 MG/DL (ref 70–110)
HCT VFR BLD AUTO: 38.6 % (ref 40–54)
HGB BLD-MCNC: 12.4 G/DL (ref 14–18)
IMM GRANULOCYTES # BLD AUTO: 0.01 K/UL (ref 0–0.04)
IMM GRANULOCYTES NFR BLD AUTO: 0.2 % (ref 0–0.5)
LACTATE SERPL-SCNC: 0.9 MMOL/L (ref 0.5–2.2)
LYMPHOCYTES # BLD AUTO: 1.7 K/UL (ref 1–4.8)
LYMPHOCYTES NFR BLD: 29.5 % (ref 18–48)
MAGNESIUM SERPL-MCNC: 1.7 MG/DL (ref 1.6–2.6)
MCH RBC QN AUTO: 27.1 PG (ref 27–31)
MCHC RBC AUTO-ENTMCNC: 32.1 G/DL (ref 32–36)
MCV RBC AUTO: 85 FL (ref 82–98)
MONOCYTES # BLD AUTO: 0.6 K/UL (ref 0.3–1)
MONOCYTES NFR BLD: 9.7 % (ref 4–15)
NEUTROPHILS # BLD AUTO: 3.2 K/UL (ref 1.8–7.7)
NEUTROPHILS NFR BLD: 56.4 % (ref 38–73)
NRBC BLD-RTO: 0 /100 WBC
PLATELET # BLD AUTO: 245 K/UL (ref 150–450)
PMV BLD AUTO: 9.9 FL (ref 9.2–12.9)
POTASSIUM SERPL-SCNC: 4.1 MMOL/L (ref 3.5–5.1)
PROT SERPL-MCNC: 8.8 G/DL (ref 6–8.4)
RBC # BLD AUTO: 4.57 M/UL (ref 4.6–6.2)
SODIUM SERPL-SCNC: 134 MMOL/L (ref 136–145)
WBC # BLD AUTO: 5.67 K/UL (ref 3.9–12.7)

## 2024-02-03 PROCEDURE — 85025 COMPLETE CBC W/AUTO DIFF WBC: CPT

## 2024-02-03 PROCEDURE — 83735 ASSAY OF MAGNESIUM: CPT

## 2024-02-03 PROCEDURE — 63600175 PHARM REV CODE 636 W HCPCS: Performed by: STUDENT IN AN ORGANIZED HEALTH CARE EDUCATION/TRAINING PROGRAM

## 2024-02-03 PROCEDURE — 96365 THER/PROPH/DIAG IV INF INIT: CPT | Mod: 59

## 2024-02-03 PROCEDURE — 63600175 PHARM REV CODE 636 W HCPCS

## 2024-02-03 PROCEDURE — 96376 TX/PRO/DX INJ SAME DRUG ADON: CPT

## 2024-02-03 PROCEDURE — 96375 TX/PRO/DX INJ NEW DRUG ADDON: CPT

## 2024-02-03 PROCEDURE — 80053 COMPREHEN METABOLIC PANEL: CPT

## 2024-02-03 PROCEDURE — 63600175 PHARM REV CODE 636 W HCPCS: Performed by: PHYSICIAN ASSISTANT

## 2024-02-03 PROCEDURE — 25000003 PHARM REV CODE 250: Performed by: STUDENT IN AN ORGANIZED HEALTH CARE EDUCATION/TRAINING PROGRAM

## 2024-02-03 PROCEDURE — 63600175 PHARM REV CODE 636 W HCPCS: Performed by: EMERGENCY MEDICINE

## 2024-02-03 PROCEDURE — 63600175 PHARM REV CODE 636 W HCPCS: Mod: JZ,JG | Performed by: EMERGENCY MEDICINE

## 2024-02-03 PROCEDURE — 25500020 PHARM REV CODE 255: Performed by: STUDENT IN AN ORGANIZED HEALTH CARE EDUCATION/TRAINING PROGRAM

## 2024-02-03 PROCEDURE — 83605 ASSAY OF LACTIC ACID: CPT

## 2024-02-03 PROCEDURE — 94761 N-INVAS EAR/PLS OXIMETRY MLT: CPT

## 2024-02-03 PROCEDURE — 11000001 HC ACUTE MED/SURG PRIVATE ROOM

## 2024-02-03 PROCEDURE — 25000003 PHARM REV CODE 250

## 2024-02-03 RX ORDER — HYDROMORPHONE HYDROCHLORIDE 1 MG/ML
0.5 INJECTION, SOLUTION INTRAMUSCULAR; INTRAVENOUS; SUBCUTANEOUS EVERY 6 HOURS PRN
Status: DISCONTINUED | OUTPATIENT
Start: 2024-02-03 | End: 2024-02-03

## 2024-02-03 RX ORDER — KETOROLAC TROMETHAMINE 15 MG/ML
15 INJECTION, SOLUTION INTRAMUSCULAR; INTRAVENOUS EVERY 6 HOURS PRN
Status: DISCONTINUED | OUTPATIENT
Start: 2024-02-03 | End: 2024-02-04 | Stop reason: HOSPADM

## 2024-02-03 RX ORDER — ACETAMINOPHEN 325 MG/1
650 TABLET ORAL
Status: DISCONTINUED | OUTPATIENT
Start: 2024-02-03 | End: 2024-02-03

## 2024-02-03 RX ORDER — SODIUM CHLORIDE 0.9 % (FLUSH) 0.9 %
10 SYRINGE (ML) INJECTION EVERY 12 HOURS PRN
Status: DISCONTINUED | OUTPATIENT
Start: 2024-02-03 | End: 2024-02-04 | Stop reason: HOSPADM

## 2024-02-03 RX ORDER — IBUPROFEN 400 MG/1
400 TABLET ORAL EVERY 6 HOURS PRN
Status: DISCONTINUED | OUTPATIENT
Start: 2024-02-03 | End: 2024-02-04 | Stop reason: HOSPADM

## 2024-02-03 RX ORDER — MORPHINE SULFATE 4 MG/ML
4 INJECTION, SOLUTION INTRAMUSCULAR; INTRAVENOUS
Status: COMPLETED | OUTPATIENT
Start: 2024-02-03 | End: 2024-02-03

## 2024-02-03 RX ORDER — CLONIDINE HYDROCHLORIDE 0.1 MG/1
0.1 TABLET ORAL EVERY 4 HOURS PRN
Status: DISCONTINUED | OUTPATIENT
Start: 2024-02-03 | End: 2024-02-04 | Stop reason: HOSPADM

## 2024-02-03 RX ORDER — IBUPROFEN 200 MG
24 TABLET ORAL
Status: DISCONTINUED | OUTPATIENT
Start: 2024-02-03 | End: 2024-02-04 | Stop reason: HOSPADM

## 2024-02-03 RX ORDER — HYDROMORPHONE HYDROCHLORIDE 1 MG/ML
0.5 INJECTION, SOLUTION INTRAMUSCULAR; INTRAVENOUS; SUBCUTANEOUS ONCE
Status: COMPLETED | OUTPATIENT
Start: 2024-02-03 | End: 2024-02-03

## 2024-02-03 RX ORDER — HYDROMORPHONE HYDROCHLORIDE 1 MG/ML
1 INJECTION, SOLUTION INTRAMUSCULAR; INTRAVENOUS; SUBCUTANEOUS EVERY 6 HOURS PRN
Status: DISCONTINUED | OUTPATIENT
Start: 2024-02-03 | End: 2024-02-04

## 2024-02-03 RX ORDER — KETOROLAC TROMETHAMINE 30 MG/ML
10 INJECTION, SOLUTION INTRAMUSCULAR; INTRAVENOUS
Status: COMPLETED | OUTPATIENT
Start: 2024-02-03 | End: 2024-02-03

## 2024-02-03 RX ORDER — IBUPROFEN 200 MG
16 TABLET ORAL
Status: DISCONTINUED | OUTPATIENT
Start: 2024-02-03 | End: 2024-02-04 | Stop reason: HOSPADM

## 2024-02-03 RX ORDER — CLINDAMYCIN PHOSPHATE 600 MG/50ML
600 INJECTION, SOLUTION INTRAVENOUS
Status: DISCONTINUED | OUTPATIENT
Start: 2024-02-03 | End: 2024-02-04 | Stop reason: HOSPADM

## 2024-02-03 RX ORDER — NALOXONE HCL 0.4 MG/ML
0.02 VIAL (ML) INJECTION
Status: DISCONTINUED | OUTPATIENT
Start: 2024-02-03 | End: 2024-02-04 | Stop reason: HOSPADM

## 2024-02-03 RX ORDER — GLUCAGON 1 MG
1 KIT INJECTION
Status: DISCONTINUED | OUTPATIENT
Start: 2024-02-03 | End: 2024-02-04 | Stop reason: HOSPADM

## 2024-02-03 RX ADMIN — KETOROLAC TROMETHAMINE 10 MG: 30 INJECTION, SOLUTION INTRAMUSCULAR; INTRAVENOUS at 12:02

## 2024-02-03 RX ADMIN — BICTEGRAVIR SODIUM, EMTRICITABINE, AND TENOFOVIR ALAFENAMIDE FUMARATE 1 TABLET: 50; 200; 25 TABLET ORAL at 04:02

## 2024-02-03 RX ADMIN — CLINDAMYCIN IN 5 PERCENT DEXTROSE 600 MG: 12 INJECTION, SOLUTION INTRAVENOUS at 04:02

## 2024-02-03 RX ADMIN — KETOROLAC TROMETHAMINE 15 MG: 15 INJECTION, SOLUTION INTRAMUSCULAR; INTRAVENOUS at 08:02

## 2024-02-03 RX ADMIN — MORPHINE SULFATE 4 MG: 4 INJECTION INTRAVENOUS at 04:02

## 2024-02-03 RX ADMIN — HYDROMORPHONE HYDROCHLORIDE 0.5 MG: 1 INJECTION, SOLUTION INTRAMUSCULAR; INTRAVENOUS; SUBCUTANEOUS at 02:02

## 2024-02-03 RX ADMIN — CLINDAMYCIN IN 5 PERCENT DEXTROSE 600 MG: 12 INJECTION, SOLUTION INTRAVENOUS at 08:02

## 2024-02-03 RX ADMIN — MORPHINE SULFATE 4 MG: 4 INJECTION INTRAVENOUS at 07:02

## 2024-02-03 RX ADMIN — CLINDAMYCIN IN 5 PERCENT DEXTROSE 600 MG: 12 INJECTION, SOLUTION INTRAVENOUS at 11:02

## 2024-02-03 RX ADMIN — IBUPROFEN 400 MG: 400 TABLET ORAL at 04:02

## 2024-02-03 RX ADMIN — IOHEXOL 75 ML: 350 INJECTION, SOLUTION INTRAVENOUS at 12:02

## 2024-02-03 RX ADMIN — HYDROMORPHONE HYDROCHLORIDE 0.5 MG: 0.5 INJECTION, SOLUTION INTRAMUSCULAR; INTRAVENOUS; SUBCUTANEOUS at 04:02

## 2024-02-03 NOTE — CONSULTS
"Otolaryngology Head and Neck Surgery E-Consult/Plan of Care    ENT called due to concerns for odontogenic abscess. Plan for transfer to [facility] for OMFS however, [facility] on diversion. Due to anticipate prolonged time prior to transfer, ENT consulted for potential intervention prior to transfer.     Per ED attending, no airways concerns. No complaints of dysphagia, dyspnea or inability to manage secretions. Patient with poor dentition.     CT personally reviewed and shows multiple carious teeth with apical lucency and associated dental abscess "epicentered about the left maxillary central incisor, lateral incisor and canine tooth with associated osseous dehiscence/destruction/periapical abscess of the adjacent left alveolar process of the maxilla". Airway widely patent.     Due to the odontogenic source of infection and need for OMFS intervention, ENT will defer intervention in this stable patient. Should patient develop airway concerns or become unstable while still at present [place] [facility], please reach out to ENT.     Lisa Pederson MD   Otolaryngology-Head and Neck Surgery   PGY3  "

## 2024-02-03 NOTE — ASSESSMENT & PLAN NOTE
Recent Labs   Lab 02/03/24  1109   *     Chronic, stable at baseline. Likely secondary to chronic history of liver disease    PLAN:  Trend on CMP

## 2024-02-03 NOTE — ASSESSMENT & PLAN NOTE
"43 M w/ history of HIV, IVDU, Hepatitis C (completed 1 month of therapy then became noncompliant), endocarditis, MRSA and ESBL bacteremias, and osteonecrosis/septic arthritis of the R hip s/p multiple washouts and displaced antibiotic spacer awaiting second stage of R total hip surgery currently on crutches that presents with 3 days of progressive L facial swelling.   -He reports a long history of dental problems with multiple "cracked teeth." Physical exam on admission with abnormal dentition  -CT imaging 02/03/24 noted concerns for large left sided dental abscess and associated overlying soft tissue infection/cellulitis     PLAN:  - ENT consulted, followup recs  - Pending transfer to [facility] for OMFS evaluation.   - Continue Clindamycin antibiotics. Followup cultures and deescalate as appropriate  - Multimodal pain regimen  "

## 2024-02-03 NOTE — PLAN OF CARE
"Otolaryngology Head and Neck Surgery E-Consult/Plan of Care    ENT called due to concerns for odontogenic abscess. Plan for transfer to [facility] for OMFS however, [facility] on diversion. Due to anticipate prolonged time prior to transfer, ENT consulted for potential intervention prior to transfer.     Per ED attending, no airways concerns. No complaints of dysphagia, dyspnea or inability to manage secretions. Patient with poor dentition.     CT personally reviewed and shows multiple carious teeth with apical lucency and associated dental abscess "epicentered about the left maxillary central incisor, lateral incisor and canine tooth with associated osseous dehiscence/destruction/periapical abscess of the adjacent left alveolar process of the maxilla". Airway widely patent.     Due to the odontogenic source of infection and need for OMFS intervention, ENT will defer intervention in this stable patient. Should patient develop airway concerns or become unstable while still at present at [facility], please reach out to ENT.     Lisa Pederson MD   Otolaryngology-Head and Neck Surgery   PGY3    "

## 2024-02-03 NOTE — ASSESSMENT & PLAN NOTE
Hx of HIV, follows with ID at [facility]. Last visit per chart review on 11/14/23.    Reports compliance with dqdyixhrk-crofpgce-usezmam ala (BIKTARVY) -25 mg Tab tablet      PLAN:  Continue jypdswgmr-ziwtjlpo-snkvcyi ala (BIKTARVY) -25 mg Tab tablet

## 2024-02-03 NOTE — ED PROVIDER NOTES
Encounter Date: 2/2/2024       History     Chief Complaint   Patient presents with    Facial Swelling     X2 days     The patient is a 43 year old male. He has a documented past medical history of drug abuse, Hep C, HIV, endocarditis, bacteremia, MRSA, ESBL, osteonecrosis and septic arthritis of right hip s/p multiple surgeries with displaced antibiotic spacer awaiting second stage of R total hip surgery currently on crutches. He is followed by ID and orthopedics at [facility].     He presents to the ER this evening for an emergent evaluation c/o acute left upper dental abscess with diffuse left sided facial swelling. He states that symptoms started 2 days ago and have progressively gotten worse. He states the degree of pain and swelling is moderate to severe. He reports chills but no definite fever. He denies any drooling, trismus, or stridor. He denies any trouble speaking, breathing or swallowing. No pre-arrival treatment.     Review of patient's allergies indicates:  No Known Allergies  Past Medical History:   Diagnosis Date    Bacteremia     Drug abuse     Endocarditis     ESBL (extended spectrum beta-lactamase) producing bacteria infection     Hepatitis C     Human immunodeficiency virus (HIV) disease     MRSA infection     Osteonecrosis of right hip     Septic arthritis of hip      Past Surgical History:   Procedure Laterality Date    HIP SURGERY Right     NOSE SURGERY       Family History   Problem Relation Age of Onset    No Known Problems Mother      Social History     Tobacco Use    Smoking status: Every Day     Current packs/day: 0.50     Types: Cigarettes    Smokeless tobacco: Never   Substance Use Topics    Alcohol use: No    Drug use: Not Currently     Types: IV, Cocaine, Marijuana     Comment: heroin      Review of Systems   Constitutional:  Positive for chills.   HENT:  Positive for dental problem and facial swelling. Negative for ear pain, trouble swallowing and voice change.    Eyes:   Negative for pain, discharge, redness and visual disturbance.   Respiratory:  Negative for cough, shortness of breath and stridor.    Cardiovascular:  Negative for chest pain.   Gastrointestinal:  Negative for abdominal pain, diarrhea, nausea and vomiting.   Musculoskeletal:  Negative for back pain, gait problem and neck pain.   Skin:  Positive for color change.   Allergic/Immunologic: Positive for immunocompromised state.   Neurological:  Negative for dizziness, syncope, weakness, light-headedness and headaches.   Psychiatric/Behavioral:  Negative for confusion.        Physical Exam     Initial Vitals [02/02/24 1806]   BP Pulse Resp Temp SpO2   137/82 103 15 98.5 °F (36.9 °C) 95 %      MAP       --         Physical Exam    Nursing note and vitals reviewed.  Constitutional: He is not diaphoretic. He appears distressed.   Alert and ambulatory using crutches. Accompanied by his wife.    HENT:   Moderate/significant swelling to left mid face; firm/tender with possible fluctuance palpated over left maxillary region. Extensive chronic appearing dental decay to most remaining teeth. No induration to oral floor. Benign oropharynx otherwise. No thrush. No drooling, trismus, or stridor. No trench mouth. No tongue swelling. Patent airway. No muffled voice. No adenopathy. No purulent or fluctuance gingiva.    Eyes:   Mild left sided infraorbital swelling    Pulmonary/Chest: No respiratory distress. He has no wheezes.   Abdominal: Abdomen is soft. There is no abdominal tenderness.   Musculoskeletal:      Comments: Right hip pain reported - chronic      Neurological: He is alert and oriented to person, place, and time.   Skin: Skin is warm and dry.   Psychiatric: He has a normal mood and affect. His behavior is normal.       ED Course   Procedures  Labs Reviewed   CBC W/ AUTO DIFFERENTIAL - Abnormal; Notable for the following components:       Result Value    Hemoglobin 13.0 (*)     MCHC 31.5 (*)     RDW 14.9 (*)     All other  components within normal limits   COMPREHENSIVE METABOLIC PANEL - Abnormal; Notable for the following components:    Sodium 132 (*)     Total Protein 10.4 (*)     Alkaline Phosphatase 150 (*)     AST 86 (*)     ALT 67 (*)     All other components within normal limits   LACTIC ACID, PLASMA - Abnormal; Notable for the following components:    Lactate (Lactic Acid) 2.3 (*)     All other components within normal limits   CULTURE, BLOOD   CULTURE, BLOOD     Results for orders placed or performed during the hospital encounter of 02/02/24   CBC auto differential   Result Value Ref Range    WBC 6.43 3.90 - 12.70 K/uL    RBC 4.79 4.60 - 6.20 M/uL    Hemoglobin 13.0 (L) 14.0 - 18.0 g/dL    Hematocrit 41.3 40.0 - 54.0 %    MCV 86 82 - 98 fL    MCH 27.1 27.0 - 31.0 pg    MCHC 31.5 (L) 32.0 - 36.0 g/dL    RDW 14.9 (H) 11.5 - 14.5 %    Platelets 214 150 - 450 K/uL    MPV 10.7 9.2 - 12.9 fL    Immature Granulocytes 0.3 0.0 - 0.5 %    Gran # (ANC) 3.8 1.8 - 7.7 K/uL    Immature Grans (Abs) 0.02 0.00 - 0.04 K/uL    Lymph # 1.9 1.0 - 4.8 K/uL    Mono # 0.6 0.3 - 1.0 K/uL    Eos # 0.1 0.0 - 0.5 K/uL    Baso # 0.07 0.00 - 0.20 K/uL    nRBC 0 0 /100 WBC    Gran % 59.0 38.0 - 73.0 %    Lymph % 29.1 18.0 - 48.0 %    Mono % 9.3 4.0 - 15.0 %    Eosinophil % 1.2 0.0 - 8.0 %    Basophil % 1.1 0.0 - 1.9 %    Differential Method Automated    Comprehensive metabolic panel   Result Value Ref Range    Sodium 132 (L) 136 - 145 mmol/L    Potassium 4.5 3.5 - 5.1 mmol/L    Chloride 100 95 - 110 mmol/L    CO2 23 23 - 29 mmol/L    Glucose 82 70 - 110 mg/dL    BUN 11 6 - 20 mg/dL    Creatinine 0.9 0.5 - 1.4 mg/dL    Calcium 9.9 8.7 - 10.5 mg/dL    Total Protein 10.4 (H) 6.0 - 8.4 g/dL    Albumin 3.5 3.5 - 5.2 g/dL    Total Bilirubin 0.4 0.1 - 1.0 mg/dL    Alkaline Phosphatase 150 (H) 55 - 135 U/L    AST 86 (H) 10 - 40 U/L    ALT 67 (H) 10 - 44 U/L    eGFR >60.0 >60 mL/min/1.73 m^2    Anion Gap 9 8 - 16 mmol/L   Lactic acid, plasma   Result Value Ref  Range    Lactate (Lactic Acid) 2.3 (H) 0.5 - 2.2 mmol/L            Imaging Results              CT Maxillofacial With Contrast (No Result on File)                      Medications   clindamycin in D5W 600 mg/50 mL IVPB 600 mg (has no administration in time range)   sodium chloride 0.9% bolus 1,000 mL 1,000 mL (0 mLs Intravenous Stopped 2/2/24 8399)     Medical Decision Making  Amount and/or Complexity of Data Reviewed  Labs: ordered.  Radiology: ordered.    Risk  Prescription drug management.                          Medical Decision Making:   Initial Assessment:   42 yo male, hx of HIV and bacteremia, presents to the ER with left upper dental abscess and facial cellulitis x 2 days with progressively worsening left sided facial swelling      Differential Diagnosis:   Odontogenic infection, facial cellulitis, maxillary abscess, sepsis, bacteremia, etc   Clinical Tests:   Lab Tests: Ordered and Reviewed  ED Management:  Vital signs reviewed - mild tachycardia noted, afebrile   Chart review completed   Case discussed with the ER attending physician   Labs including blood cultures obtained   IV Clindamycin ordered   CT maxillofacial w/contrast ordered   Anticipate need to facilitate transfer to hospital with OMFS services   I gave report and signed out patient to PM CASEY who will assume further care and management due to pending maxillofacial CT and end of my shift              Clinical Impression:  Final diagnoses:  [K04.7] Dental abscess (Primary)  [L03.211] Facial cellulitis  [R22.0] Left facial swelling  [B20] HIV infection, unspecified symptom status  [E87.1] Hyponatremia  [R79.89] Elevated lactic acid level  [R00.0] Tachycardia  [Z86.19] History of hepatitis C                 Dheeraj Hyatt, SUNNY  02/02/24 0163

## 2024-02-03 NOTE — ED NOTES
"Pt presents to ED via personal transport w/ c/o left-sided facial swelling x2 days. Pt unsure what precipitated swelling. Reports similar episode recently to top of his mouth -- family at bedside reports "abscess on the top of his mouth." States he "took some old antibiotics" for his mouth abscess after leaving [facility] before getting seen. Warmth, edema, and erythema noted to left cheek + periorbital area. Pt also arrives to ED with crutches -- states "[facility] took my hip and never put another one in."    Patient identifiers for José Luis Rolon 43 y.o. male checked and correct.  Chief Complaint   Patient presents with    Facial Swelling     X2 days     Past Medical History:   Diagnosis Date    Hepatitis C      Allergies reported: Review of patient's allergies indicates:  No Known Allergies         "

## 2024-02-03 NOTE — PROVIDER PROGRESS NOTES - EMERGENCY DEPT.
"  Encounter Date: 2/2/2024    ED Physician Progress Notes        I assumed care of patient at sign out pending:   [  ] ENT evaluation and recommendations  [  ] disposition      Briefly, Active pending a plan. 42yo M with HIV. On left side, he has 2cm odontogenic abscess and facial cellulitis. Tracks periapically close to nose and erosion of maxillary bone. Got clinda iv q8 and morphine.  Not drainable here.  [facility] is on "saturation". ENT here aware and will look at CT and see if they can drain the abscess: if they can drain abscess, could be admitted for IV abx, then [facility] outpatient vs admit and transfer.     Summary of Results:  ENT informed me that there is nothing for them to do down here.  If he develops airway compromise or Dimitrios's angina they will certainly be available for help.  The patient is wheelchair-bound and unable to get himself to [facility].  Therefore he will need to be admitted here for IV antibiotics until a bed opens up at [facility] who is still on saturation at this time.  I did evaluate the patient and he is comfortable with this plan.  Still no airway involvement at this time.    Vitals:    02/03/24 0348 02/03/24 0413 02/03/24 0626 02/03/24 0722   BP: 109/73  112/74    Pulse: 70  68    Resp:  16 16 16   Temp: 98.8 °F (37.1 °C)      TempSrc: Oral      SpO2: (!) 94%  98%    Weight:       Height:        02/03/24 0725   BP: 123/75   Pulse: 75   Resp: 16   Temp:    TempSrc:    SpO2: 96%   Weight:    Height:      CT Maxillofacial With Contrast   Final Result   Abnormal      1. Findings in keeping with large left sided dental abscess measuring up to 1.8 cm epicentered about the left maxillary central incisor, lateral incisor and canine tooth with associated osseous dehiscence/destruction/periapical abscess of the adjacent left alveolar process of the maxilla.  Appropriate emergent subspecialty consultation advised.   2. Findings concerning for associated overlying soft tissue infection/cellulitis noting " significant soft tissue inflammatory change/stranding and skin thickening involving the left pre-maxillary, nasal and mandibular soft tissues as discussed above.   3. Findings in keeping with widespread dental/odontogenic disease with multiple carious teeth and periapical lucencies/periapical abscesses as above.   4. Odontogenic left maxillary sinus disease.         Electronically signed by: Judith Atkins MD   Date:    02/03/2024   Time:    01:20        Labs Reviewed   CBC W/ AUTO DIFFERENTIAL - Abnormal; Notable for the following components:       Result Value    Hemoglobin 13.0 (*)     MCHC 31.5 (*)     RDW 14.9 (*)     All other components within normal limits   COMPREHENSIVE METABOLIC PANEL - Abnormal; Notable for the following components:    Sodium 132 (*)     Total Protein 10.4 (*)     Alkaline Phosphatase 150 (*)     AST 86 (*)     ALT 67 (*)     All other components within normal limits   LACTIC ACID, PLASMA - Abnormal; Notable for the following components:    Lactate (Lactic Acid) 2.3 (*)     All other components within normal limits   CULTURE, BLOOD   CULTURE, BLOOD   ISTAT LACTATE     .    Disposition:  admit.

## 2024-02-03 NOTE — ASSESSMENT & PLAN NOTE
Patient with alexi total protein of 10.4 on admission with albumin of 3.5 representing a likely high A/G ratio. Would recommend obtaining plasma electrophoresis in the outpatient setting, as patient is unlikely to follow [place] with providers [place] [facility]. 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

## 2024-02-03 NOTE — ASSESSMENT & PLAN NOTE
Patient has chronic liver disease due to hepatitis c.  Reports following with outpatient provider and received treatment, completed 1 month of therapy then became noncompliant     MELD 3.0: 15 at 5/2/2023  5:22 AM  MELD-Na: 9 at 5/7/2023  6:28 AM  Calculated from:  Serum Creatinine: 0.67 mg/dL (Using min of 1 mg/dL) at 5/2/2023  5:22 AM  Serum Sodium: 134 mmol/L at 5/2/2023  5:22 AM  Total Bilirubin: 2.9 mg/dL at 4/30/2023  4:54 PM  Serum Albumin: 3.0 g/dL at 4/30/2023  4:54 PM  INR(ratio): 1.2 at 5/2/2023  5:22 AM  Age at listing (hypothetical): 42 years  Sex: Male at 5/2/2023  5:22 AM      PLAN:  -Followup with outpatient Hepatology  -Avoid hepatotoxic medications  -Trend hepatic function on daily labs.

## 2024-02-03 NOTE — PLAN OF CARE
Problem: Adult Inpatient Plan of Care  Goal: Plan of Care Review  Outcome: Ongoing, Progressing  Goal: Patient-Specific Goal (Individualized)  Outcome: Ongoing, Progressing  Goal: Absence of Hospital-Acquired Illness or Injury  Outcome: Ongoing, Progressing  Goal: Optimal Comfort and Wellbeing  Outcome: Ongoing, Progressing  Goal: Readiness for Transition of Care  Outcome: Ongoing, Progressing     Problem: Pain Acute  Goal: Acceptable Pain Control and Functional Ability  Outcome: Ongoing, Progressing     Problem: Fall Injury Risk  Goal: Absence of Fall and Fall-Related Injury  Outcome: Ongoing, Progressing     Addressed patients pain, pump, position, need for potty, and possessions in reach. Patient remains free of falls, and verbalizes understanding in fall prevention and safety. Safety measures remain in place. Reinforced fall prevention and safety education. Call light and personal belongings within reach, bed in lowest position with wheels locked, side rails up x2, Instructed to call with any needs or complaints, verbalized understanding. Continue current plan of care.

## 2024-02-03 NOTE — PROVIDER PROGRESS NOTES - EMERGENCY DEPT.
Encounter Date: 2/2/2024    ED Physician Progress Notes          ED Physician Hand-off Note:    ED Course: I assumed care of patient from off-going ED physician team. Briefly, Patient is a 42 yo M presenting with facial swelling.    At the time of signout plan was pending CT.    Medications given in the ED:    Medications   clindamycin in D5W 600 mg/50 mL IVPB 600 mg (0 mg Intravenous Stopped 2/3/24 0050)   sodium chloride 0.9% bolus 1,000 mL 1,000 mL (0 mLs Intravenous Stopped 2/2/24 2239)   ketorolac injection 9.999 mg (9.999 mg Intravenous Given 2/3/24 0049)   iohexoL (OMNIPAQUE 350) injection 75 mL (75 mLs Intravenous Given 2/3/24 0027)       Imaging Results              CT Maxillofacial With Contrast (In process)                     Disposition:     CT still pending. Will sign out to night team. Anticipate admission.     Impression:     Final diagnoses:  [K04.7] Dental abscess (Primary)  [L03.211] Facial cellulitis  [R22.0] Left facial swelling  [B20] HIV infection, unspecified symptom status  [E87.1] Hyponatremia  [R79.89] Elevated lactic acid level  [R00.0] Tachycardia  [Z86.19] History of hepatitis C

## 2024-02-03 NOTE — PROVIDER PROGRESS NOTES - EMERGENCY DEPT.
Encounter Date: 2/2/2024    ED Physician Progress Notes          ED staff SIGN OUT NOTE    Patient s/o to me by Dr. Sandra pending CT face    CT face with large left-sided dental abscess approximately 1.8 cm as well as dehiscence and destruction of the adjacent left alveolar process of the maxilla and periapical abscess.  Overlying cellulitis of the left face, premaxillary, nasal and mandibular soft tissue    I evaluated the patient, was unable to locate a clear drainable mouth was in the oral cavity that I felt I can appropriately drain this large abscess    Discussed patient with the transfer Center, [facility] is currently on saturation and can not accept the patient for transfer but will reach out when spaces becomes available      I spoke with ENT who will evaluate the patient then imaging and see if this might be something they could drain while awaiting transfer    At the time of sign-out, patient is pending ENT evaluation and anticipate transfer to [facility]

## 2024-02-03 NOTE — NURSING
Patient arrived to room 502 via stretcher, VS taken and documented, admission documentation completed, SCD's applied, oriented to room and equipment, allowed time for questions, all questions answered, no further concerns voiced at this time, safety measures in place, call bell with in reach, instructed to call with any needs, verbalized understanding, continue current plan of care.

## 2024-02-03 NOTE — H&P
"  [facility] Medicine  History & Physical    Patient Name: José Luis Rolon  MRN: [id number]  Patient Class: IP- Inpatient  Admission Date: 2/2/2024  Attending Physician: Trisha Alberto*   Primary Care Provider: Isha, Primary Doctor         Patient information was obtained from patient, past medical records, and ER records.     Subjective:     Principal Problem:Facial cellulitis    Chief Complaint:   Chief Complaint   Patient presents with    Facial Swelling     X2 days        HPI: Mr. José Luis Rolon is a 43 year old male with a past history of HIV, IVDU, Hepatitis C (completed 1 month of therapy then became noncompliant), endocarditis, MRSA and ESBL bacteremias, and osteonecrosis/septic arthritis of the R hip s/p multiple washouts and displaced antibiotic spacer awaiting second stage of R total hip surgery currently on crutches that presents with 3 days of progressive L facial swelling. He reports a long history of dental problems with multiple "cracked teeth." He has never had a problem such as this. He waited at home, hoping for improvement with 800 ibuprofen q4-6h. The facial swelling progressed and he decided to present to [facility] ED. He denies drainage of pus or blood, difficulty swallowing, double vision, chest pain, and shortness of breath. He endorses some L eye vision changes secondary to the adjacent edema of the soft tissue overlying the L zygomatic.    Concerning his extensive medical history: He still ambulates with crutches after displacement of R hip antibiotic spacer. He was scheduled for follow-up with [facility] orthopedic surgery for next-stage surgical planning but missed that appointment along with all attempts at communication. He started treatment for hepatitis C with Epclusa in late October and completed one month, but he reports he went to longterm for a short time and stopped taking the medication afterwards. He has participated in drug rehab multiple times but within the past 3 months, he " has returned to daily IV heroin use. Following his near 5 month admission for R hip septic arthritis, he reports that he lost all utilities, water, and power in his home, and he has been living without since. He reports little to no social support, with only his fiance providing some financial support. He reports that he cannot work due to his hip.    On presentation to [facility] ED, patient was afebrile, tachycardic to 103, but otherwise normotensive and satting well on RA. Labs remarkable for WBC of 6.43, AST of 86, ALT of 67, T.bili of 0.4, Lactate of 2.3, and total protein of 10.4. CT max/face obtained and remarkable for a large (1.8cm) L dental abscess overlying L central incisor and canine with osseous destruction of adjacent L alveolar process of maxilla as well as overlying soft tissue cellulitis with stranding/thickening. Patient was started on clindamycin, received a 1L bolus of IVF, and received morphine/toradol for pain control. ENT was consulted, and determined that patient would likely benefit from the maxillofacial surgical services offered at [facility]. Patient to be admitted to receive IV antibiotics while awaiting transfer.    Past Medical History:   Diagnosis Date    Bacteremia     Drug abuse     Endocarditis     ESBL (extended spectrum beta-lactamase) producing bacteria infection     Hepatitis C     Human immunodeficiency virus (HIV) disease     MRSA infection     Osteonecrosis of right hip     Septic arthritis of hip        Past Surgical History:   Procedure Laterality Date    HIP SURGERY Right     NOSE SURGERY         Review of patient's allergies indicates:  No Known Allergies    No current facility-administered medications on file prior to encounter.     Current Outpatient Medications on File Prior to Encounter   Medication Sig    darunavir-iam-emtri-tenof ala (SYMTUZA) [phone] mg Tab Take by mouth.    traZODone (DESYREL) 100 MG tablet Take 100 mg by mouth every evening.     Family History        Problem Relation (Age of Onset)    No Known Problems Mother          Tobacco Use    Smoking status: Every Day     Current packs/day: 0.50     Types: Cigarettes    Smokeless tobacco: Never   Substance and Sexual Activity    Alcohol use: No    Drug use: Not Currently     Types: IV, Cocaine, Marijuana     Comment: heroin     Sexual activity: Not on file     Review of Systems   Constitutional:  Positive for activity change. Negative for chills, fatigue and fever.   HENT:  Positive for dental problem, facial swelling, mouth sores and sore throat. Negative for ear pain, hearing loss and trouble swallowing.    Eyes:  Positive for visual disturbance.   Respiratory:  Negative for cough, choking, chest tightness and shortness of breath.    Cardiovascular:  Negative for chest pain, palpitations and leg swelling.   Gastrointestinal:  Negative for abdominal pain, constipation, diarrhea, nausea and vomiting.   Genitourinary:  Negative for difficulty urinating and dysuria.   Skin:  Negative for rash.   Neurological:  Positive for facial asymmetry. Negative for light-headedness.     Objective:     Vital Signs (Most Recent):  Temp: 99.1 °F (37.3 °C) (02/03/24 1327)  Pulse: 77 (02/03/24 1327)  Resp: 20 (02/03/24 1327)  BP: (!) 138/98 (02/03/24 1327)  SpO2: 97 % (02/03/24 1327) Vital Signs (24h Range):  Temp:  [98.3 °F (36.8 °C)-99.1 °F (37.3 °C)] 99.1 °F (37.3 °C)  Pulse:  [] 77  Resp:  [15-20] 20  SpO2:  [94 %-98 %] 97 %  BP: (109-143)/(69-98) 138/98     Weight: 63.5 kg (140 lb)  Body mass index is 22.6 kg/m².     Physical Exam  Constitutional:       General: He is not in acute distress.     Appearance: He is ill-appearing. He is not toxic-appearing.      Comments: Uncomfortable, sullen man with L facial edema lying in bed with R leg completely internally rotated   HENT:      Head: Normocephalic and atraumatic.      Right Ear: External ear normal.      Left Ear: External ear normal.      Nose: Nose normal.      Mouth/Throat:       Mouth: Mucous membranes are moist.      Pharynx: No oropharyngeal exudate or posterior oropharyngeal erythema.      Comments: L facial edema overlying the maxilla/zygomatic bones.  Area of fluctuance with small ulcer on interior aspect of L cheek; no other open wounds in or near mouth are visualized  No drainage of blood or pus  No brawniness or tenderness to palpation of the floor of mouth  Extremely poor dentition with multiple broken teeth and profuse dental caries  Eyes:      General: No scleral icterus.     Extraocular Movements: Extraocular movements intact.      Conjunctiva/sclera: Conjunctivae normal.      Pupils: Pupils are equal, round, and reactive to light.   Cardiovascular:      Rate and Rhythm: Regular rhythm. Tachycardia present.      Pulses: Normal pulses.      Heart sounds: Normal heart sounds.   Pulmonary:      Effort: Pulmonary effort is normal. No respiratory distress.      Breath sounds: Normal breath sounds.   Abdominal:      General: Abdomen is flat. Bowel sounds are normal. There is no distension.      Palpations: Abdomen is soft.      Tenderness: There is no abdominal tenderness. There is no guarding.   Musculoskeletal:      Cervical back: Neck supple. No tenderness.      Right lower leg: No edema.      Left lower leg: No edema.   Lymphadenopathy:      Cervical: Cervical adenopathy present.   Skin:     Capillary Refill: Capillary refill takes less than 2 seconds.      Findings: No rash.   Neurological:      General: No focal deficit present.      Mental Status: He is alert and oriented to person, place, and time. Mental status is at baseline.      Cranial Nerves: No cranial nerve deficit.              CRANIAL NERVES     CN III, IV, VI   Pupils are equal, round, and reactive to light.       Significant Labs: All pertinent labs within the past 24 hours have been reviewed.  Blood Culture:   Recent Labs   Lab 02/02/24 2038   LABBLOO No Growth to date  No Growth to date     CBC:   Recent  "Labs   Lab 02/02/24 2038 02/03/24  1109   WBC 6.43 5.67   HGB 13.0* 12.4*   HCT 41.3 38.6*    245     CMP:   Recent Labs   Lab 02/02/24 2038 02/03/24  1109   * 134*   K 4.5 4.1    102   CO2 23 26   GLU 82 96   BUN 11 10   CREATININE 0.9 0.8   CALCIUM 9.9 9.3   PROT 10.4* 8.8*   ALBUMIN 3.5 3.0*   BILITOT 0.4 0.4   ALKPHOS 150* 126   AST 86* 74*   ALT 67* 59*   ANIONGAP 9 6*     Lactic Acid:   Recent Labs   Lab 02/02/24 2038 02/03/24  1109   LACTATE 2.3* 0.9     Magnesium:   Recent Labs   Lab 02/03/24  1109   MG 1.7       Significant Imaging: I have reviewed all pertinent imaging results/findings within the past 24 hours.  CT: I have reviewed all pertinent results/findings within the past 24 hours and my personal findings are:  1.8cm abscess overlying L central incisor and canine w/ destruction of nearby L maxilla with soft tissue edema overlying  Assessment/Plan:     * Facial cellulitis  43 M w/ history of HIV, IVDU, Hepatitis C (completed 1 month of therapy then became noncompliant), endocarditis, MRSA and ESBL bacteremias, and osteonecrosis/septic arthritis of the R hip s/p multiple washouts and displaced antibiotic spacer awaiting second stage of R total hip surgery currently on crutches that presents with 3 days of progressive L facial swelling.   -He reports a long history of dental problems with multiple "cracked teeth." Physical exam on admission with abnormal dentition  -CT imaging 02/03/24 noted concerns for large left sided dental abscess and associated overlying soft tissue infection/cellulitis     PLAN:  - ENT consulted, followup recs  - Pending transfer to [facility] for OMFS evaluation.   - Continue Clindamycin antibiotics. Followup cultures and deescalate as appropriate  - Multimodal pain regimen    Hyperproteinemia  Patient with alexi total protein of 10.4 on admission with albumin of 3.5 representing a likely high A/G ratio. Would recommend obtaining plasma electrophoresis in the " "outpatient setting, as patient is unlikely to follow up with providers at [facility]. 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      Hyponatremia    Recent Labs   Lab 02/03/24  1109   *     Chronic, stable at baseline. Likely secondary to chronic history of liver disease    PLAN:  Trend on CMP    Status post total replacement of right hip  Hx of osteonecrosis/septic arthritis of the R hip s/p multiple washouts and displaced antibiotic spacer awaiting second stage of R total hip surgery currently on crutches     He still ambulates with crutches after displacement of R hip antibiotic spacer. He was scheduled for follow[place]up [place] [facility] [place] for next-stage surgical planning but missed that appointment along with all attempts at communication . Reports significant limitation in ADLs secondary to immobility and associated pain    PLAN:  -Followup with [facility] for repair  -Multimodal pain regimen while inpatient    Transaminitis  See "Chronic hepatitis C without hepatic coma " A&P        Dental abscess  See "Facial cellulitis " A&P        Opioid use disorder  Reports daily heroin use.       Plan:  -Seizure precautions   -Consider Opiate Withdrawal Protocol PRN for symptoms of withdrawal as appropriate  clonidine 0.1 mg po q4 hours prn opiate withdrawal HTN  dicylomine 10 mg q6 hours prn abdominal muscle cramps  bismuth PRN for diarrhea OR loperamide 2 mg prn diarrhea (max= 16 mg/24 hours)  methocarbamol 500 mg po q 6 hours prn muscle spasm  acetaminophen 1g TID PRN for muscle aches OR ibuprofen 400mg po q6hrs PRN pain  ondansetron 4mg PO q8hrs PRN OR phenergan 12.5mg PO q8hrs PRN nausea  hydroxyzine 50mg PO q8hrs PRN anxiety   melatonin 6mg PO PRN for insomnia       Human immunodeficiency virus (HIV) disease  Hx of HIV, follows with [place] at [facility]. Last visit per chart review on 11/14/23.    Reports compliance with bdbvbmcsy-fuadpwyo-uecdaqh ala (BIKTARVY) -25 mg Tab tablet  "     PLAN:  Continue ocybxtgzy-fimltljs-ffasmsb ala (BIKTARVY) -25 mg Tab tablet        Chronic hepatitis C without hepatic coma  Patient has chronic liver disease due to hepatitis c.  Reports following with outpatient provider and received treatment, completed 1 month of therapy then became noncompliant     MELD 3.0: 15 at 5/2/2023  5:22 AM  MELD-Na: 9 at 5/7/2023  6:28 AM  Calculated from:  Serum Creatinine: 0.67 mg/dL (Using min of 1 mg/dL) at 5/2/2023  5:22 AM  Serum Sodium: 134 mmol/L at 5/2/2023  5:22 AM  Total Bilirubin: 2.9 mg/dL at 4/30/2023  4:54 PM  Serum Albumin: 3.0 g/dL at 4/30/2023  4:54 PM  INR(ratio): 1.2 at 5/2/2023  5:22 AM  Age at listing (hypothetical): 42 years  Sex: Male at 5/2/2023  5:22 AM      PLAN:  -Followup with outpatient Hepatology  -Avoid hepatotoxic medications  -Trend hepatic function on daily labs.      VTE Risk Mitigation (From admission, onward)           Ordered     IP VTE HIGH RISK PATIENT  Once         02/03/24 1002     Place sequential compression device  Until discontinued         02/03/24 1002                                    Michael Romero MD  Department of Hospital Medicine  [facility] - Surgery

## 2024-02-03 NOTE — HPI
"Mr. José Luis Rolon is a 43 year old male with a past history of HIV, IVDU, Hepatitis C (completed 1 month of therapy then became noncompliant), endocarditis, MRSA and ESBL bacteremias, and osteonecrosis/septic arthritis of the R hip s/p multiple washouts and displaced antibiotic spacer awaiting second stage of R total hip surgery currently on crutches that presents with 3 days of progressive L facial swelling. He reports a long history of dental problems with multiple "cracked teeth." He has never had a problem such as this. He waited at home, hoping for improvement with 800 ibuprofen q4-6h. The facial swelling progressed and he decided to present to [facility] ED. He denies drainage of pus or blood, difficulty swallowing, double vision, chest pain, and shortness of breath. He endorses some L eye vision changes secondary to the adjacent edema of the soft tissue overlying the L zygomatic.    Concerning his extensive medical history: He still ambulates with crutches after displacement of R hip antibiotic spacer. He was scheduled for follow-up with [facility] orthopedic surgery for next-stage surgical planning but missed that appointment along with all attempts at communication. He started treatment for hepatitis C with Epclusa in late October and completed one month, but he reports he went to [facility] for a short time and stopped taking the medication afterwards. He has participated in drug rehab multiple times but within the past 3 months, he has returned to daily IV heroin use. Following his near 5 month admission for R hip septic arthritis, he reports that he lost all utilities, water, and power in his home, and he has been living without since. He reports little to no social support, with only his fiance providing some financial support. He reports that he cannot work due to his hip.    On presentation to [facility] ED, patient was afebrile, tachycardic to 103, but otherwise normotensive and satting well on RA. Labs remarkable for WBC of " 6.43, AST of 86, ALT of 67, T.bili of 0.4, Lactate of 2.3, and total protein of 10.4. CT max/face obtained and remarkable for a large (1.8cm) L dental abscess overlying L central incisor and canine with osseous destruction of adjacent L alveolar process of maxilla as well as overlying soft tissue cellulitis with stranding/thickening. Patient was started on clindamycin, received a 1L bolus of IVF, and received morphine/toradol for pain control. ENT was consulted, and determined that patient would likely benefit from the maxillofacial surgical services offered at [facility]. Patient to be admitted to receive IV antibiotics while awaiting transfer.

## 2024-02-03 NOTE — NURSING
Nurses Note -- 4 Eyes      2/3/2024   5:37 PM      Skin assessed during: Admit      [x] No Altered Skin Integrity Present    [x]Prevention Measures Documented      [] Yes- Altered Skin Integrity Present or Discovered   [] LDA Added if Not in Epic (Describe Wound)   [] New Altered Skin Integrity was Present on Admit and Documented in LDA   [] Wound Image Taken    Wound Care Consulted? No    Attending Nurse:  Jayleen Morrison RN    Second RN/Staff Member:   HENNA Vera

## 2024-02-03 NOTE — ASSESSMENT & PLAN NOTE
Hx of osteonecrosis/septic arthritis of the R hip s/p multiple washouts and displaced antibiotic spacer awaiting second stage of R total hip surgery currently on crutches     He still ambulates with crutches after displacement of R hip antibiotic spacer. He was scheduled for follow-up with [facility] orthopedic surgery for next-stage surgical planning but missed that appointment along with all attempts at communication . Reports significant limitation in ADLs secondary to immobility and associated pain    PLAN:  -Followup with [facility] Orthopedic Surgeon for repair  -Multimodal pain regimen while inpatient

## 2024-02-03 NOTE — SUBJECTIVE & OBJECTIVE
Past Medical History:   Diagnosis Date    Bacteremia     Drug abuse     Endocarditis     ESBL (extended spectrum beta-lactamase) producing bacteria infection     Hepatitis C     Human immunodeficiency virus (HIV) disease     MRSA infection     Osteonecrosis of right hip     Septic arthritis of hip        Past Surgical History:   Procedure Laterality Date    HIP SURGERY Right     NOSE SURGERY         Review of patient's allergies indicates:  No Known Allergies    No current facility-administered medications on file prior to encounter.     Current Outpatient Medications on File Prior to Encounter   Medication Sig    darunavir-iam-emtri-tenof ala (SYMTUZA) [phone] mg Tab Take by mouth.    traZODone (DESYREL) 100 MG tablet Take 100 mg by mouth every evening.     Family History       Problem Relation (Age of Onset)    No Known Problems Mother          Tobacco Use    Smoking status: Every Day     Current packs/day: 0.50     Types: Cigarettes    Smokeless tobacco: Never   Substance and Sexual Activity    Alcohol use: No    Drug use: Not Currently     Types: IV, Cocaine, Marijuana     Comment: heroin     Sexual activity: Not on file     Review of Systems   Constitutional:  Positive for activity change. Negative for chills, fatigue and fever.   HENT:  Positive for dental problem, facial swelling, mouth sores and sore throat. Negative for ear pain, hearing loss and trouble swallowing.    Eyes:  Positive for visual disturbance.   Respiratory:  Negative for cough, choking, chest tightness and shortness of breath.    Cardiovascular:  Negative for chest pain, palpitations and leg swelling.   Gastrointestinal:  Negative for abdominal pain, constipation, diarrhea, nausea and vomiting.   Genitourinary:  Negative for difficulty urinating and dysuria.   Skin:  Negative for rash.   Neurological:  Positive for facial asymmetry. Negative for light-headedness.     Objective:     Vital Signs (Most Recent):  Temp: 99.1 °F (37.3 °C)  (02/03/24 1327)  Pulse: 77 (02/03/24 1327)  Resp: 20 (02/03/24 1327)  BP: (!) 138/98 (02/03/24 1327)  SpO2: 97 % (02/03/24 1327) Vital Signs (24h Range):  Temp:  [98.3 °F (36.8 °C)-99.1 °F (37.3 °C)] 99.1 °F (37.3 °C)  Pulse:  [] 77  Resp:  [15-20] 20  SpO2:  [94 %-98 %] 97 %  BP: (109-143)/(69-98) 138/98     Weight: 63.5 kg (140 lb)  Body mass index is 22.6 kg/m².     Physical Exam  Constitutional:       General: He is not in acute distress.     Appearance: He is ill-appearing. He is not toxic-appearing.      Comments: Uncomfortable, sullen man with L facial edema lying in bed with R leg completely internally rotated   HENT:      Head: Normocephalic and atraumatic.      Right Ear: External ear normal.      Left Ear: External ear normal.      Nose: Nose normal.      Mouth/Throat:      Mouth: Mucous membranes are moist.      Pharynx: No oropharyngeal exudate or posterior oropharyngeal erythema.      Comments: L facial edema overlying the maxilla/zygomatic bones.  Area of fluctuance with small ulcer on interior aspect of L cheek; no other open wounds in or near mouth are visualized  No drainage of blood or pus    No brawniness or tenderness to palpation of the floor of mouth  Eyes:      General: No scleral icterus.     Extraocular Movements: Extraocular movements intact.      Conjunctiva/sclera: Conjunctivae normal.      Pupils: Pupils are equal, round, and reactive to light.   Cardiovascular:      Rate and Rhythm: Regular rhythm. Tachycardia present.      Pulses: Normal pulses.      Heart sounds: Normal heart sounds.   Pulmonary:      Effort: Pulmonary effort is normal. No respiratory distress.      Breath sounds: Normal breath sounds.   Abdominal:      General: Abdomen is flat. Bowel sounds are normal. There is no distension.      Palpations: Abdomen is soft.      Tenderness: There is no abdominal tenderness. There is no guarding.   Musculoskeletal:      Cervical back: Neck supple. No tenderness.      Right  lower leg: No edema.      Left lower leg: No edema.   Lymphadenopathy:      Cervical: Cervical adenopathy present.   Skin:     Capillary Refill: Capillary refill takes less than 2 seconds.      Findings: No rash.   Neurological:      General: No focal deficit present.      Mental Status: He is alert and oriented to person, place, and time. Mental status is at baseline.      Cranial Nerves: No cranial nerve deficit.              CRANIAL NERVES     CN III, IV, VI   Pupils are equal, round, and reactive to light.       Significant Labs: All pertinent labs within the past 24 hours have been reviewed.  Blood Culture:   Recent Labs   Lab 02/02/24 2038   LABBLOO No Growth to date  No Growth to date     CBC:   Recent Labs   Lab 02/02/24 2038 02/03/24  1109   WBC 6.43 5.67   HGB 13.0* 12.4*   HCT 41.3 38.6*    245     CMP:   Recent Labs   Lab 02/02/24 2038 02/03/24  1109   * 134*   K 4.5 4.1    102   CO2 23 26   GLU 82 96   BUN 11 10   CREATININE 0.9 0.8   CALCIUM 9.9 9.3   PROT 10.4* 8.8*   ALBUMIN 3.5 3.0*   BILITOT 0.4 0.4   ALKPHOS 150* 126   AST 86* 74*   ALT 67* 59*   ANIONGAP 9 6*     Lactic Acid:   Recent Labs   Lab 02/02/24 2038 02/03/24  1109   LACTATE 2.3* 0.9     Magnesium:   Recent Labs   Lab 02/03/24  1109   MG 1.7       Significant Imaging: I have reviewed all pertinent imaging results/findings within the past 24 hours.  CT: I have reviewed all pertinent results/findings within the past 24 hours and my personal findings are:  1.8cm abscess overlying L central incisor and canine w/ destruction of nearby L maxilla with soft tissue edema overlying

## 2024-02-03 NOTE — ED NOTES
Received report from TEJA Francois.  Pt awake and alert; resting quietly on stretcher.  Pt remains on continuous cardiac and pulse ox monitoring with non-invasive blood pressure to cycle every hour.  VS stable; NSR noted. Pt denies pain at this time; no acute distress or discomfort reported or observed.  Pt denies restroom needs at this time; is able to reposition self on stretcher. Bed locked in lowest position; side rails up and locked x 2; call light, bedside table, and personal belongings within reach. Room assessed for safety measures and cleanliness; no action needed at this time. Plan of care discussed.  Pt instructed to alert nurse for assistance and before attempting to get out of bed; verbalizes understanding. Pt denies needs or complaints at this time; will continue to monitor.

## 2024-02-03 NOTE — ASSESSMENT & PLAN NOTE
Reports daily heroin use.       Plan:  -Seizure precautions   -Consider Opiate Withdrawal Protocol PRN for symptoms of withdrawal as appropriate  clonidine 0.1 mg po q4 hours prn opiate withdrawal HTN  dicylomine 10 mg q6 hours prn abdominal muscle cramps  bismuth PRN for diarrhea OR loperamide 2 mg prn diarrhea (max= 16 mg/24 hours)  methocarbamol 500 mg po q 6 hours prn muscle spasm  acetaminophen 1g TID PRN for muscle aches OR ibuprofen 400mg po q6hrs PRN pain  ondansetron 4mg PO q8hrs PRN OR phenergan 12.5mg PO q8hrs PRN nausea  hydroxyzine 50mg PO q8hrs PRN anxiety   melatonin 6mg PO PRN for insomnia

## 2024-02-04 VITALS
SYSTOLIC BLOOD PRESSURE: 140 MMHG | WEIGHT: 140.63 LBS | DIASTOLIC BLOOD PRESSURE: 93 MMHG | HEART RATE: 73 BPM | BODY MASS INDEX: 22.6 KG/M2 | HEIGHT: 66 IN | TEMPERATURE: 99 F | RESPIRATION RATE: 16 BRPM | OXYGEN SATURATION: 97 %

## 2024-02-04 LAB
ALBUMIN SERPL BCP-MCNC: 2.8 G/DL (ref 3.5–5.2)
ALP SERPL-CCNC: 117 U/L (ref 55–135)
ALT SERPL W/O P-5'-P-CCNC: 52 U/L (ref 10–44)
ANION GAP SERPL CALC-SCNC: 7 MMOL/L (ref 8–16)
AST SERPL-CCNC: 60 U/L (ref 10–40)
BASOPHILS # BLD AUTO: 0.04 K/UL (ref 0–0.2)
BASOPHILS NFR BLD: 0.9 % (ref 0–1.9)
BILIRUB SERPL-MCNC: 0.3 MG/DL (ref 0.1–1)
BUN SERPL-MCNC: 12 MG/DL (ref 6–20)
CALCIUM SERPL-MCNC: 9.4 MG/DL (ref 8.7–10.5)
CHLORIDE SERPL-SCNC: 102 MMOL/L (ref 95–110)
CO2 SERPL-SCNC: 24 MMOL/L (ref 23–29)
CREAT SERPL-MCNC: 0.8 MG/DL (ref 0.5–1.4)
DIFFERENTIAL METHOD BLD: ABNORMAL
EOSINOPHIL # BLD AUTO: 0.2 K/UL (ref 0–0.5)
EOSINOPHIL NFR BLD: 3.7 % (ref 0–8)
ERYTHROCYTE [DISTWIDTH] IN BLOOD BY AUTOMATED COUNT: 14.5 % (ref 11.5–14.5)
EST. GFR  (NO RACE VARIABLE): >60 ML/MIN/1.73 M^2
GLUCOSE SERPL-MCNC: 91 MG/DL (ref 70–110)
HCT VFR BLD AUTO: 42.9 % (ref 40–54)
HGB BLD-MCNC: 13.7 G/DL (ref 14–18)
IMM GRANULOCYTES # BLD AUTO: 0.01 K/UL (ref 0–0.04)
IMM GRANULOCYTES NFR BLD AUTO: 0.2 % (ref 0–0.5)
LYMPHOCYTES # BLD AUTO: 1.2 K/UL (ref 1–4.8)
LYMPHOCYTES NFR BLD: 26.9 % (ref 18–48)
MAGNESIUM SERPL-MCNC: 1.7 MG/DL (ref 1.6–2.6)
MCH RBC QN AUTO: 27 PG (ref 27–31)
MCHC RBC AUTO-ENTMCNC: 31.9 G/DL (ref 32–36)
MCV RBC AUTO: 84 FL (ref 82–98)
MONOCYTES # BLD AUTO: 0.4 K/UL (ref 0.3–1)
MONOCYTES NFR BLD: 9.2 % (ref 4–15)
NEUTROPHILS # BLD AUTO: 2.6 K/UL (ref 1.8–7.7)
NEUTROPHILS NFR BLD: 59.1 % (ref 38–73)
NRBC BLD-RTO: 0 /100 WBC
PHOSPHATE SERPL-MCNC: 3.8 MG/DL (ref 2.7–4.5)
PLATELET # BLD AUTO: 228 K/UL (ref 150–450)
PMV BLD AUTO: 10.9 FL (ref 9.2–12.9)
POTASSIUM SERPL-SCNC: 4.3 MMOL/L (ref 3.5–5.1)
PROT SERPL-MCNC: 8.4 G/DL (ref 6–8.4)
RBC # BLD AUTO: 5.08 M/UL (ref 4.6–6.2)
SODIUM SERPL-SCNC: 133 MMOL/L (ref 136–145)
WBC # BLD AUTO: 4.35 K/UL (ref 3.9–12.7)

## 2024-02-04 PROCEDURE — 85025 COMPLETE CBC W/AUTO DIFF WBC: CPT

## 2024-02-04 PROCEDURE — 25000003 PHARM REV CODE 250

## 2024-02-04 PROCEDURE — 76937 US GUIDE VASCULAR ACCESS: CPT

## 2024-02-04 PROCEDURE — 84100 ASSAY OF PHOSPHORUS: CPT

## 2024-02-04 PROCEDURE — 63600175 PHARM REV CODE 636 W HCPCS: Mod: JZ,JG | Performed by: EMERGENCY MEDICINE

## 2024-02-04 PROCEDURE — 05HY33Z INSERTION OF INFUSION DEVICE INTO UPPER VEIN, PERCUTANEOUS APPROACH: ICD-10-PCS | Performed by: HOSPITALIST

## 2024-02-04 PROCEDURE — 36410 VNPNXR 3YR/> PHY/QHP DX/THER: CPT

## 2024-02-04 PROCEDURE — 25000003 PHARM REV CODE 250: Performed by: STUDENT IN AN ORGANIZED HEALTH CARE EDUCATION/TRAINING PROGRAM

## 2024-02-04 PROCEDURE — 80053 COMPREHEN METABOLIC PANEL: CPT

## 2024-02-04 PROCEDURE — 36415 COLL VENOUS BLD VENIPUNCTURE: CPT

## 2024-02-04 PROCEDURE — 83735 ASSAY OF MAGNESIUM: CPT

## 2024-02-04 PROCEDURE — 63600175 PHARM REV CODE 636 W HCPCS

## 2024-02-04 PROCEDURE — C1751 CATH, INF, PER/CENT/MIDLINE: HCPCS

## 2024-02-04 RX ORDER — HYDROXYZINE HYDROCHLORIDE 25 MG/1
50 TABLET, FILM COATED ORAL EVERY 8 HOURS PRN
Status: DISCONTINUED | OUTPATIENT
Start: 2024-02-04 | End: 2024-02-04 | Stop reason: HOSPADM

## 2024-02-04 RX ORDER — METHADONE HYDROCHLORIDE 10 MG/1
10 TABLET ORAL DAILY
Status: DISCONTINUED | OUTPATIENT
Start: 2024-02-04 | End: 2024-02-04 | Stop reason: HOSPADM

## 2024-02-04 RX ORDER — HYDROMORPHONE HYDROCHLORIDE 1 MG/ML
0.5 INJECTION, SOLUTION INTRAMUSCULAR; INTRAVENOUS; SUBCUTANEOUS EVERY 6 HOURS PRN
Status: DISCONTINUED | OUTPATIENT
Start: 2024-02-04 | End: 2024-02-04 | Stop reason: HOSPADM

## 2024-02-04 RX ORDER — MAGNESIUM SULFATE HEPTAHYDRATE 40 MG/ML
2 INJECTION, SOLUTION INTRAVENOUS ONCE
Status: COMPLETED | OUTPATIENT
Start: 2024-02-04 | End: 2024-02-04

## 2024-02-04 RX ADMIN — CLINDAMYCIN IN 5 PERCENT DEXTROSE 600 MG: 12 INJECTION, SOLUTION INTRAVENOUS at 09:02

## 2024-02-04 RX ADMIN — TRAMADOL HYDROCHLORIDE 25 MG: 50 TABLET, COATED ORAL at 12:02

## 2024-02-04 RX ADMIN — HYDROXYZINE HYDROCHLORIDE 50 MG: 25 TABLET, FILM COATED ORAL at 01:02

## 2024-02-04 RX ADMIN — HYDROMORPHONE HYDROCHLORIDE 0.5 MG: 0.5 INJECTION, SOLUTION INTRAMUSCULAR; INTRAVENOUS; SUBCUTANEOUS at 01:02

## 2024-02-04 RX ADMIN — TRAMADOL HYDROCHLORIDE 25 MG: 50 TABLET, COATED ORAL at 04:02

## 2024-02-04 RX ADMIN — MAGNESIUM SULFATE HEPTAHYDRATE 2 G: 40 INJECTION, SOLUTION INTRAVENOUS at 09:02

## 2024-02-04 RX ADMIN — BICTEGRAVIR SODIUM, EMTRICITABINE, AND TENOFOVIR ALAFENAMIDE FUMARATE 1 TABLET: 50; 200; 25 TABLET ORAL at 09:02

## 2024-02-04 RX ADMIN — IBUPROFEN 400 MG: 400 TABLET ORAL at 04:02

## 2024-02-04 RX ADMIN — KETOROLAC TROMETHAMINE 15 MG: 15 INJECTION, SOLUTION INTRAMUSCULAR; INTRAVENOUS at 11:02

## 2024-02-04 NOTE — PLAN OF CARE
Problem: Adult Inpatient Plan of Care  Goal: Plan of Care Review  Outcome: Ongoing, Progressing  Goal: Patient-Specific Goal (Individualized)  Outcome: Ongoing, Progressing  Goal: Absence of Hospital-Acquired Illness or Injury  Outcome: Ongoing, Progressing  Goal: Optimal Comfort and Wellbeing  Outcome: Ongoing, Progressing  Goal: Readiness for Transition of Care  Outcome: Ongoing, Progressing     Problem: Pain Acute  Goal: Acceptable Pain Control and Functional Ability  Outcome: Ongoing, Progressing     Problem: Fall Injury Risk  Goal: Absence of Fall and Fall-Related Injury  Outcome: Ongoing, Progressing     Problem: Infection  Goal: Absence of Infection Signs and Symptoms  Outcome: Ongoing, Progressing     Problem: Surgical Site Infection  Goal: Absence of Infection Signs and Symptoms  Outcome: Ongoing, Progressing
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
## 2024-02-04 NOTE — PROGRESS NOTES
VSS. Randy diet--NPO since MN. L facial/forehead edema & redness noted. Up as toll. Voiding well per urinal. R AC SL infiltrated after antibiotic-- 2 attempts for restart-- order placed for midline. Pt given Tramadol & Ibuprofen this morning for pain as no IV access. Slept well. Pleasant.

## 2024-02-04 NOTE — NURSING
Patient complaining of withdrawal symptoms, complains of anxiety. Notified Dr. Romero. MD states he spoke with [facility] and that patient is not candidate for Methadone. MD to place order for hydroxyzine and place patient on telemetry.

## 2024-02-04 NOTE — HOSPITAL COURSE
Patient was admitted to Hospital Medicine for medical management and evaluation of maxillary abscess. Patient was continued on IV Clindamycin for antibiotic coverage while pending transfer to [facility] for dental surgical services. Patient's opioid withdrawal was managed with as needed dosing of clonidine for hypertension and hydroxyzine for anxiety. Discussed possibility of inpatient methadone treatment with [facility], but was advised it would not be possible to initiate methadone for withdrawals while inpatient without discussing with outpatient center, which was closed over the weekend. Patient decided to leave AMA before po medications could be prescribed. Patient was advised of the imminent risks to life and organ function as a result of spreading infection without antibiotic treatment and/or surgical intervention. Patient signed AMA disclosure. In addition to all available contacts, patient himself was not responsive to phone calls after leaving the hospital in hopes of sending an antibiotic prescription to a pharmacy.

## 2024-02-04 NOTE — PROCEDURES
"José Luis Rolon is a 43 y.o. male patient.    Temp: 98.7 °F (37.1 °C) (02/04/24 0407)  Pulse: 70 (02/04/24 0407)  Resp: 18 (02/04/24 0434)  BP: (!) 159/99 (02/04/24 0407)  SpO2: (!) 94 % (02/04/24 0407)  Weight: 63.8 kg (140 lb 10.5 oz) (02/03/24 1327)  Height: 5' 6" (167.6 cm) (02/03/24 1327)    PICC  Date/Time: 2/4/2024 7:11 AM  Consent Done: Yes  Time out: Immediately prior to procedure a time out was called to verify the correct patient, procedure, equipment, support staff and site/side marked as required  Indications: med administration and vascular access  Anesthesia: local infiltration  Local anesthetic: lidocaine 1% without epinephrine  Anesthetic Total (mL): 2  Preparation: skin prepped with ChloraPrep  Skin prep agent dried: skin prep agent completely dried prior to procedure  Sterile barriers: all five maximum sterile barriers used - cap, mask, sterile gown, sterile gloves, and large sterile sheet  Hand hygiene: hand hygiene performed prior to central venous catheter insertion  Location details: right basilic  Catheter type: single lumen  Catheter size: 4 Fr  Catheter Length: 11cm    Ultrasound guidance: yes  Vessel Caliber: medium and patent, compressibility normal  Vascular Doppler: not done  Needle advanced into vessel with real time Ultrasound guidance.  Guidewire confirmed in vessel.  Sterile sheath used.  no esophageal manometryNumber of attempts: 1  Post-procedure: blood return through all ports, chlorhexidine patch and sterile dressing applied  Estimated blood loss (mL): 0  Specimens: No  Implants: No  Assessment: successful placement  Complications: none          Name TEJA Salter  2/4/2024    "

## 2024-02-07 LAB
BACTERIA BLD CULT: NORMAL
BACTERIA BLD CULT: NORMAL